# Patient Record
Sex: MALE | Race: WHITE
[De-identification: names, ages, dates, MRNs, and addresses within clinical notes are randomized per-mention and may not be internally consistent; named-entity substitution may affect disease eponyms.]

---

## 2020-02-17 ENCOUNTER — HOSPITAL ENCOUNTER (INPATIENT)
Dept: HOSPITAL 74 - JER | Age: 85
LOS: 8 days | Discharge: HOME | DRG: 291 | End: 2020-02-25
Attending: INTERNAL MEDICINE | Admitting: INTERNAL MEDICINE
Payer: COMMERCIAL

## 2020-02-17 VITALS — BODY MASS INDEX: 19.5 KG/M2

## 2020-02-17 DIAGNOSIS — Z95.1: ICD-10-CM

## 2020-02-17 DIAGNOSIS — E87.6: ICD-10-CM

## 2020-02-17 DIAGNOSIS — I50.23: ICD-10-CM

## 2020-02-17 DIAGNOSIS — R74.0: ICD-10-CM

## 2020-02-17 DIAGNOSIS — I13.0: Primary | ICD-10-CM

## 2020-02-17 DIAGNOSIS — Q61.02: ICD-10-CM

## 2020-02-17 DIAGNOSIS — Z95.5: ICD-10-CM

## 2020-02-17 DIAGNOSIS — I24.8: ICD-10-CM

## 2020-02-17 DIAGNOSIS — R18.8: ICD-10-CM

## 2020-02-17 DIAGNOSIS — K80.20: ICD-10-CM

## 2020-02-17 DIAGNOSIS — N18.4: ICD-10-CM

## 2020-02-17 DIAGNOSIS — E83.42: ICD-10-CM

## 2020-02-17 DIAGNOSIS — H54.8: ICD-10-CM

## 2020-02-17 DIAGNOSIS — R64: ICD-10-CM

## 2020-02-17 DIAGNOSIS — E80.6: ICD-10-CM

## 2020-02-17 DIAGNOSIS — N17.9: ICD-10-CM

## 2020-02-17 DIAGNOSIS — I25.119: ICD-10-CM

## 2020-02-17 LAB
ALBUMIN SERPL-MCNC: 3.4 G/DL (ref 3.4–5)
ALBUMIN SERPL-MCNC: 3.4 G/DL (ref 3.4–5)
ALP SERPL-CCNC: 129 U/L (ref 45–117)
ALP SERPL-CCNC: 130 U/L (ref 45–117)
ALT SERPL-CCNC: 46 U/L (ref 13–61)
ALT SERPL-CCNC: 47 U/L (ref 13–61)
ANION GAP SERPL CALC-SCNC: 10 MMOL/L (ref 8–16)
ANION GAP SERPL CALC-SCNC: 10 MMOL/L (ref 8–16)
ANION GAP SERPL CALC-SCNC: 6 MMOL/L (ref 8–16)
AST SERPL-CCNC: 53 U/L (ref 15–37)
AST SERPL-CCNC: 55 U/L (ref 15–37)
BASOPHILS # BLD: 0.2 % (ref 0–2)
BASOPHILS # BLD: 0.5 % (ref 0–2)
BILIRUB CONJ SERPL-MCNC: 0.5 MG/DL (ref 0–0.2)
BILIRUB SERPL-MCNC: 1.1 MG/DL (ref 0.2–1)
BILIRUB SERPL-MCNC: 1.2 MG/DL (ref 0.2–1)
BUN SERPL-MCNC: 35.7 MG/DL (ref 7–18)
BUN SERPL-MCNC: 36.3 MG/DL (ref 7–18)
CALCIUM SERPL-MCNC: 9 MG/DL (ref 8.5–10.1)
CALCIUM SERPL-MCNC: 9.5 MG/DL (ref 8.5–10.1)
CHLORIDE SERPL-SCNC: 102 MMOL/L (ref 98–107)
CHLORIDE SERPL-SCNC: 103 MMOL/L (ref 98–107)
CHLORIDE SERPL-SCNC: 104 MMOL/L (ref 98–107)
CHOLEST SERPL-MCNC: 185 MG/DL (ref 50–200)
CO2 SERPL-SCNC: 25 MMOL/L (ref 21–32)
CO2 SERPL-SCNC: 27 MMOL/L (ref 21–32)
CO2 SERPL-SCNC: 28 MMOL/L (ref 21–32)
CREAT SERPL-MCNC: 2 MG/DL (ref 0.55–1.3)
CREAT SERPL-MCNC: 2 MG/DL (ref 0.55–1.3)
DEPRECATED RDW RBC AUTO: 15.3 % (ref 11.9–15.9)
DEPRECATED RDW RBC AUTO: 15.4 % (ref 11.9–15.9)
EOSINOPHIL # BLD: 0.1 % (ref 0–4.5)
EOSINOPHIL # BLD: 0.2 % (ref 0–4.5)
GLUCOSE SERPL-MCNC: 104 MG/DL (ref 74–106)
GLUCOSE SERPL-MCNC: 95 MG/DL (ref 74–106)
HCT VFR BLD CALC: 42.3 % (ref 35.4–49)
HCT VFR BLD CALC: 43.7 % (ref 35.4–49)
HDLC SERPL-MCNC: 53 MG/DL (ref 40–60)
HGB BLD-MCNC: 13.8 GM/DL (ref 11.7–16.9)
HGB BLD-MCNC: 14.2 GM/DL (ref 11.7–16.9)
LDLC SERPL CALC-MCNC: 111 MG/DL (ref 5–100)
LYMPHOCYTES # BLD: 10.5 % (ref 8–40)
LYMPHOCYTES # BLD: 15.5 % (ref 8–40)
MCH RBC QN AUTO: 27.5 PG (ref 25.7–33.7)
MCH RBC QN AUTO: 27.6 PG (ref 25.7–33.7)
MCHC RBC AUTO-ENTMCNC: 32.4 G/DL (ref 32–35.9)
MCHC RBC AUTO-ENTMCNC: 32.6 G/DL (ref 32–35.9)
MCV RBC: 84.8 FL (ref 80–96)
MCV RBC: 84.9 FL (ref 80–96)
MONOCYTES # BLD AUTO: 7.5 % (ref 3.8–10.2)
MONOCYTES # BLD AUTO: 8 % (ref 3.8–10.2)
NEUTROPHILS # BLD: 76.6 % (ref 42.8–82.8)
NEUTROPHILS # BLD: 80.9 % (ref 42.8–82.8)
PLATELET # BLD AUTO: 134 K/MM3 (ref 134–434)
PLATELET # BLD AUTO: 156 K/MM3 (ref 134–434)
PMV BLD: 9.5 FL (ref 7.5–11.1)
PMV BLD: 9.9 FL (ref 7.5–11.1)
POTASSIUM SERPLBLD-SCNC: 4.7 MMOL/L (ref 3.5–5.1)
POTASSIUM SERPLBLD-SCNC: 5 MMOL/L (ref 3.5–5.1)
POTASSIUM SERPLBLD-SCNC: 5 MMOL/L (ref 3.5–5.1)
PROT SERPL-MCNC: 6.2 G/DL (ref 6.4–8.2)
PROT SERPL-MCNC: 6.2 G/DL (ref 6.4–8.2)
RBC # BLD AUTO: 4.99 M/MM3 (ref 4–5.6)
RBC # BLD AUTO: 5.14 M/MM3 (ref 4–5.6)
SODIUM SERPL-SCNC: 137 MMOL/L (ref 136–145)
SODIUM SERPL-SCNC: 139 MMOL/L (ref 136–145)
SODIUM SERPL-SCNC: 140 MMOL/L (ref 136–145)
TRIGL SERPL-MCNC: 84 MG/DL (ref 0–150)
WBC # BLD AUTO: 6.7 K/MM3 (ref 4–10)
WBC # BLD AUTO: 8 K/MM3 (ref 4–10)

## 2020-02-17 PROCEDURE — A9537 TC99M MEBROFENIN: HCPCS

## 2020-02-17 RX ADMIN — HEPARIN SODIUM SCH UNIT: 5000 INJECTION, SOLUTION INTRAVENOUS; SUBCUTANEOUS at 21:49

## 2020-02-17 RX ADMIN — ASPIRIN 81 MG SCH MG: 81 TABLET ORAL at 11:07

## 2020-02-17 RX ADMIN — FUROSEMIDE SCH MG: 10 INJECTION, SOLUTION INTRAVENOUS at 13:29

## 2020-02-17 RX ADMIN — ATORVASTATIN CALCIUM SCH MG: 40 TABLET, FILM COATED ORAL at 21:49

## 2020-02-17 RX ADMIN — CLOPIDOGREL BISULFATE SCH MG: 75 TABLET, FILM COATED ORAL at 11:06

## 2020-02-17 RX ADMIN — HEPARIN SODIUM SCH UNIT: 5000 INJECTION, SOLUTION INTRAVENOUS; SUBCUTANEOUS at 13:28

## 2020-02-17 RX ADMIN — HEPARIN SODIUM SCH UNIT: 5000 INJECTION, SOLUTION INTRAVENOUS; SUBCUTANEOUS at 05:20

## 2020-02-17 NOTE — HP
<Apryl Serrano - Last Filed: 02/17/20 06:17>


CHIEF COMPLAINT: shorness of breath, lower extremity edema





PCP: at [facility]





HISTORY OF PRESENT ILLNESS:


85 y.o. M poor historian PMH HTN, CAD s/p 2x stent placements, CHF, legal 

blindness presenting for LE edema & SOB. This began 3 days ago when he noticed 

he was becoming short of breath w/ exertion, now progressing to SOB at rest. He 

endorses orthopnea using 3-4 pillows to sleep at night. Denies paroxysmal 

nocturnal dyspnea, denies cough, no recent URI symptoms. Ambulates with cane 

but not well as he is unable to see. He is unaware of most of his medical 

history as his wife takes care of his medical concerns, but usually follows up 

with  [facility]. Wife is unreachable by phone at this time. 





ROS:


+ SOB, LE edema


denies HA/ CP/ cough/ fevers/ chills/ abd pain/ myalgias/ parasthesias/ urinary 

or bowel changes





ER course was notable for:


(1) trop 0.09


(2) EKG sub optimal quality


(3) BNP 33,000's


(4) 10mg IV Lasix





Recent Travel: denies





PAST MEDICAL HISTORY: as per hpi





PAST SURGICAL HISTORY: CABG





Social History: lives w/ wife


Smoking: denies


Alcohol:denies


Drugs: denies





Allergies





No Known Allergies Allergy (Verified 02/17/20 01:23)


 








HOME MEDICATIONS:











PHYSICAL EXAMINATION


 Vital Signs - 24 hr











  02/17/20 02/17/20





  01:21 03:52


 


Temperature 97.6 F 


 


Pulse Rate 102 H 86


 


Respiratory 18 





Rate  


 


Blood Pressure 121/68 


 


O2 Sat by Pulse 100 98





Oximetry (%)  











GENERAL: Awake, alert, and fully oriented, in no acute distress.


HEENT: NCAT. B/l eye blindness. MMM. No JVD. 


LUNGS: Decr breath sounds at b/l bases R>L. No wheezes, and no crackles. No 

accessory muscle use.


HEART: Tachycardic, normal S1 and S2 without murmur, rub or gallop. Midline 

chest scar noted from prior surgery.


ABDOMEN: Tender to palpation RUW & epigastric region. Soft, not distended, 

normoactive bowel sounds, no guarding.


MUSCULOSKELETAL: Normal range of motion at all joints. No bony deformities or 

tenderness. No CVA tenderness.


EXTREMITIES: 2+ pulses, warm, well-perfused. No peripheral edema. 


PSYCHIATRIC: Appropriate mood and affect.


SKIN: Warm, dry, normal turgor





 Laboratory Results - last 24 hr











  02/17/20 02/17/20 02/17/20





  01:58 01:58 01:58


 


WBC   8.0 


 


RBC   5.14 


 


Hgb   14.2 


 


Hct   43.7 


 


MCV   84.9 


 


MCH   27.5 


 


MCHC   32.4 


 


RDW   15.3 


 


Plt Count   156 


 


MPV   9.9 


 


Absolute Neuts (auto)   6.5 


 


Neutrophils %   80.9 


 


Lymphocytes %   10.5 


 


Monocytes %   8.0 


 


Eosinophils %   0.1 


 


Basophils %   0.5 


 


Nucleated RBC %   0 


 


Sodium  Cancelled  


 


Potassium  Cancelled  


 


Chloride  Cancelled  


 


Carbon Dioxide  Cancelled  


 


Anion Gap  Cancelled  


 


BUN  Cancelled  


 


Creatinine  Cancelled  


 


Est GFR (CKD-EPI)AfAm  Cancelled  


 


Est GFR (CKD-EPI)NonAf  Cancelled  


 


Random Glucose  Cancelled  


 


Calcium  Cancelled  


 


Total Bilirubin  Cancelled  


 


AST  Cancelled  


 


ALT  Cancelled  


 


Alkaline Phosphatase  Cancelled  


 


Creatine Kinase  Cancelled  


 


Creatine Kinase Index   


 


CK-MB (CK-2)   


 


Troponin I  Cancelled  


 


B-Natriuretic Peptide    Cancelled


 


Total Protein  Cancelled  


 


Albumin  Cancelled  














  02/17/20 02/17/20





  02:30 02:30


 


WBC  


 


RBC  


 


Hgb  


 


Hct  


 


MCV  


 


MCH  


 


MCHC  


 


RDW  


 


Plt Count  


 


MPV  


 


Absolute Neuts (auto)  


 


Neutrophils %  


 


Lymphocytes %  


 


Monocytes %  


 


Eosinophils %  


 


Basophils %  


 


Nucleated RBC %  


 


Sodium  140 


 


Potassium  5.0 


 


Chloride  104 


 


Carbon Dioxide  25 


 


Anion Gap  10 


 


BUN  36.3 H 


 


Creatinine  2.0 H 


 


Est GFR (CKD-EPI)AfAm  34.25 


 


Est GFR (CKD-EPI)NonAf  29.56 


 


Random Glucose  104 


 


Calcium  9.0 


 


Total Bilirubin  1.2 H 


 


AST  55 H 


 


ALT  47 


 


Alkaline Phosphatase  130 H 


 


Creatine Kinase  241 


 


Creatine Kinase Index  2.5 


 


CK-MB (CK-2)  6.1 H 


 


Troponin I  0.09 H 


 


B-Natriuretic Peptide   91259.3 H


 


Total Protein  6.2 L 


 


Albumin  3.4 











ASSESSMENT/PLAN:


85 y.o. M poor historian PMH HTN, CAD s/p 2x stent placements, CHF, legal 

blindness presenting for acute CHF exacerbation.





#Acute CHF exacerbation


-Repeat EKG as 1st was suboptimal in quality


-+ trop 0.09, trend


-f/u echo


-daily weights, strict Is & Os


-giving 80mg IV pish lasix; s/p 10mg IV push in ED


-starting 40mg IV lasix BID


-monitor on tele


-cardio consulted





#CARLY vs CKD


-f/u prior labs from [facility]


-BUN/ Cr 36.3/2


-f/u AM labs


-renal U/S


-UA, urine lytes





#HTN


-unsure of home meds unable to contact wife, patient is unsure of his pharmacy


-ASA 81mg daily


-starting low dose beta blocker for now, 12.5mg lopressor


-monitor vitals closely


-med rec 





#Elevated bili


-endorses abd pain on palpation


-f/u direct bili


-f/u RUQ U/S





#DVT PPX


-heparin SQ TID





#FEN


-no standing fluids


-trend lytes replete prn


-sodium controlled diet





#Dispo


-telemetry





Wife is [place]-- patient is unsure of her # but remembered these 2 #'s: [phone]/ [phone]. Called both multiple attempts with no response. 





ATTENDING PHYSICIAN STATEMENT





I saw and evaluated the patient.


I reviewed the resident's note and discussed the case with the resident.


I agree with the resident's findings and plan as documented.








SUBJECTIVE:








OBJECTIVE:








ASSESSMENT AND PLAN:








<Fabio Patel - Last Filed: 02/17/20 06:49>


CHIEF COMPLAINT:





PCP:





HISTORY OF PRESENT ILLNESS:








ER course was notable for:


(1)


(2)


(3)





Recent Travel:





PAST MEDICAL HISTORY:





PAST SURGICAL HISTORY:





Social History:


Smoking:


Alcohol:


Drugs: 





Allergies





No Known Allergies Allergy (Verified 02/17/20 01:23)


 








HOME MEDICATIONS:


 Home Medications











 Medication  Instructions  Recorded


 


Unobtainable  02/17/20








REVIEW OF SYSTEMS


CONSTITUTIONAL: 


Absent:  fever, chills, diaphoresis, generalized weakness, malaise, loss of 

appetite, weight change


HEENT: 


Absent:  rhinorrhea, nasal congestion, throat pain, throat swelling, difficulty 

swallowing, mouth swelling, ear pain, eye pain, visual changes


CARDIOVASCULAR: 


Absent: chest pain, syncope, palpitations, irregular heart rate, lightheadedness

, peripheral edema


RESPIRATORY: 


Absent: cough, shortness of breath, dyspnea with exertion, orthopnea, wheezing, 

stridor, hemoptysis


GASTROINTESTINAL:


Absent: abdominal pain, abdominal distension, nausea, vomiting, diarrhea, 

constipation, melena, hematochezia


GENITOURINARY: 


Absent: dysuria, frequency, urgency, hesitancy, hematuria, flank pain, genital 

pain


MUSCULOSKELETAL: 


Absent: myalgia, arthralgia, joint swelling, back pain, neck pain


SKIN: 


Absent: rash, itching, pallor


HEMATOLOGIC/IMMUNOLOGIC: 


Absent: easy bleeding, easy bruising, lymphadenopathy, frequent infections


ENDOCRINE:


Absent: unexplained weight gain, unexplained weight loss, heat intolerance, 

cold intolerance


NEUROLOGIC: 


Absent: headache, focal weakness or paresthesias, dizziness, unsteady gait, 

seizure, mental status changes, bladder or bowel incontinence


PSYCHIATRIC: 


Absent: anxiety, depression, suicidal or homicidal ideation, hallucinations.








PHYSICAL EXAMINATION


 Vital Signs - 24 hr











  02/17/20 02/17/20 02/17/20





  01:21 03:52 05:28


 


Temperature 97.6 F  


 


Pulse Rate 102 H 86 


 


Pulse Rate [   98 H





Left]   


 


Respiratory 18  16





Rate   


 


Blood Pressure 121/68  


 


Blood Pressure   140/94





[Left Arm]   


 


O2 Sat by Pulse 100 98 100





Oximetry (%)   











GENERAL: Awake, alert, and fully oriented, in no acute distress.


HEAD: Normal with no signs of trauma.


EYES: Pupils equal, round and reactive to light, extraocular movements intact, 

sclera anicteric, conjunctiva clear. No lid lag.


EARS, NOSE, THROAT: Ears normal, nares patent, oropharynx clear without 

exudates. Moist mucous membranes.


NECK: Normal range of motion, supple without lymphadenopathy, JVD, or masses.


LUNGS: Breath sounds equal, clear to auscultation bilaterally. No wheezes, and 

no crackles. No accessory muscle use.


HEART: Regular rate and rhythm, normal S1 and S2 without murmur, rub or gallop.


ABDOMEN: Soft, nontender, not distended, normoactive bowel sounds, no guarding, 

no rebound, no masses.  No hepatomegaly or  splenomegaly. 


MUSCULOSKELETAL: Normal range of motion at all joints. No bony deformities or 

tenderness. No CVA tenderness.


UPPER EXTREMITIES: 2+ pulses, warm, well-perfused. No cyanosis. No clubbing. No 

peripheral edema.


LOWER EXTREMITIES: 2+ pulses, warm, well-perfused. No calf tenderness. No 

peripheral edema. 


NEUROLOGICAL:  Cranial nerves II-XII intact. Normal speech. Normal gait.


PSYCHIATRIC: Cooperative. Good eye contact. Appropriate mood and affect.


SKIN: Warm, dry, normal turgor, no rashes or lesions noted, normal capillary 

refill. 


 Laboratory Results - last 24 hr











  02/17/20 02/17/20 02/17/20





  01:58 01:58 01:58


 


WBC   8.0 


 


RBC   5.14 


 


Hgb   14.2 


 


Hct   43.7 


 


MCV   84.9 


 


MCH   27.5 


 


MCHC   32.4 


 


RDW   15.3 


 


Plt Count   156 


 


MPV   9.9 


 


Absolute Neuts (auto)   6.5 


 


Neutrophils %   80.9 


 


Lymphocytes %   10.5 


 


Monocytes %   8.0 


 


Eosinophils %   0.1 


 


Basophils %   0.5 


 


Nucleated RBC %   0 


 


Sodium  Cancelled  


 


Potassium  Cancelled  


 


Chloride  Cancelled  


 


Carbon Dioxide  Cancelled  


 


Anion Gap  Cancelled  


 


BUN  Cancelled  


 


Creatinine  Cancelled  


 


Est GFR (CKD-EPI)AfAm  Cancelled  


 


Est GFR (CKD-EPI)NonAf  Cancelled  


 


Random Glucose  Cancelled  


 


Calcium  Cancelled  


 


Total Bilirubin  Cancelled  


 


AST  Cancelled  


 


ALT  Cancelled  


 


Alkaline Phosphatase  Cancelled  


 


Creatine Kinase  Cancelled  


 


Creatine Kinase Index   


 


CK-MB (CK-2)   


 


Troponin I  Cancelled  


 


B-Natriuretic Peptide    Cancelled


 


Total Protein  Cancelled  


 


Albumin  Cancelled  














  02/17/20 02/17/20





  02:30 02:30


 


WBC  


 


RBC  


 


Hgb  


 


Hct  


 


MCV  


 


MCH  


 


MCHC  


 


RDW  


 


Plt Count  


 


MPV  


 


Absolute Neuts (auto)  


 


Neutrophils %  


 


Lymphocytes %  


 


Monocytes %  


 


Eosinophils %  


 


Basophils %  


 


Nucleated RBC %  


 


Sodium  140 


 


Potassium  5.0 


 


Chloride  104 


 


Carbon Dioxide  25 


 


Anion Gap  10 


 


BUN  36.3 H 


 


Creatinine  2.0 H 


 


Est GFR (CKD-EPI)AfAm  34.25 


 


Est GFR (CKD-EPI)NonAf  29.56 


 


Random Glucose  104 


 


Calcium  9.0 


 


Total Bilirubin  1.2 H 


 


AST  55 H 


 


ALT  47 


 


Alkaline Phosphatase  130 H 


 


Creatine Kinase  241 


 


Creatine Kinase Index  2.5 


 


CK-MB (CK-2)  6.1 H 


 


Troponin I  0.09 H 


 


B-Natriuretic Peptide   65498.3 H


 


Total Protein  6.2 L 


 


Albumin  3.4 











ASSESSMENT/PLAN:








Visit type





- Emergency Visit


Emergency Visit: Yes


ED Registration Date: 02/17/20


Care time: The patient presented to the Emergency Department on the above date 

and was hospitalized for further evaluation of their emergent condition.





- New Patient


This patient is new to me today: Yes


Date on this admission: 02/17/20





- Critical Care


Critical Care patient: No





ATTENDING PHYSICIAN STATEMENT





I saw and evaluated the patient.


I reviewed the resident's note and discussed the case with the resident.


I agree with the resident's findings and plan as documented.








SUBJECTIVE: 85 years oldf MalePMH HTN, CAD s/p 2x stent placements, CHF, legal 

blindness presented to ED with worsening of b/ LE swelling and shortness of 

breath. According to patient hsi symptoms started 3 days ago and progressively 

getting worse. he is a poor historian and not happy being here as he said he 

suppose to go to hospital in [facility]. He is agitated and wants to 

go to other hospital and wants to talk to his wife. He denies chest pain, nausea

,vomiting,fever,dizziness, LOC. 








OBJECTIVE:


 Last Vital Signs











Temp Pulse Resp BP Pulse Ox


 


 97.6 F   98 H  16   140/94   100 


 


 02/17/20 01:21  02/17/20 05:28  02/17/20 05:28  02/17/20 05:28  02/17/20 05:28











CXR noted for R pleural effusion


elevated bun/cr, elevated BNP  





GENERAL: Normal built,  NAD


Head : NC/ AT 


eyes : b/l eye blindness 


LUNGS:decreased breath sounds Right lower lung 


HEART:RRR,  normal S1 and S2 without murmur, rub or gallop. chest wall scar 

noted 


ABDOMEN: Soft, mild tender on RUQ an depihastric region  not distended,


EXTREMITIES: b/l Lower extremities edema pitting 3+


NEUROLOGICAL:  A&o x3, No focal neuralgic deficit 


SKIN: Warm, dry, normal turgor.








Acute CHF exacerbation ( Unknown EF) 


CARLY vs CKD- unknown baseline '


HTN


CAD s/p stents Hx of ?CABG


Elevated troponin due to CHF vs CARLY r/o ACS 








 








ASSESSMENT AND PLAN:


Admit to tele


serial cardiac enzymes, EKG 


fluid restriction, daily weights


IV lasix 40mg BID


ECHO 


intake out put 


Confirm home medications 


start low dose of betablocker 


urine lytes, 


renal and RUQ US due to elevated bili and mild RUQ pain 


c/w aspirin 81 


SVT ppx - heparin sq


get cardiac work up records from [facility] 





Discussed with resident staff in conference.

## 2020-02-17 NOTE — PN
Physical Exam: 


SUBJECTIVE: Patient seen and examined. He has no complaints. He says he feels 

fatigued and SOB when ambulating.








OBJECTIVE:





 Vital Signs











 Period  Temp  Pulse  Resp  BP Sys/Tyler  Pulse Ox


 


 Last 24 Hr  97.6 F-97.9 F    16-22  121-140/68-96  











GENERAL: The patient is awake, alert, and fully oriented, in no acute distress.


LUNGS: Breath sounds equal, clear to auscultation bilaterally, no wheezes, no 

crackles, no accessory muscle use. 


HEART: Regular rate and rhythm, S1, S2 without murmur, rub or gallop.


ABDOMEN: Soft, nontender, nondistended, normoactive bowel sounds, no guarding, 

no rebound, no hepatosplenomegaly, no masses.


EXTREMITIES: 2+ pulses, warm, well-perfused, 3+ edema. 














 Laboratory Results - last 24 hr











  02/17/20 02/17/20 02/17/20





  01:58 01:58 01:58


 


WBC   8.0 


 


RBC   5.14 


 


Hgb   14.2 


 


Hct   43.7 


 


MCV   84.9 


 


MCH   27.5 


 


MCHC   32.4 


 


RDW   15.3 


 


Plt Count   156 


 


MPV   9.9 


 


Absolute Neuts (auto)   6.5 


 


Neutrophils %   80.9 


 


Lymphocytes %   10.5 


 


Monocytes %   8.0 


 


Eosinophils %   0.1 


 


Basophils %   0.5 


 


Nucleated RBC %   0 


 


Sodium  Cancelled  


 


Potassium  Cancelled  


 


Chloride  Cancelled  


 


Carbon Dioxide  Cancelled  


 


Anion Gap  Cancelled  


 


BUN  Cancelled  


 


Creatinine  Cancelled  


 


Est GFR (CKD-EPI)AfAm  Cancelled  


 


Est GFR (CKD-EPI)NonAf  Cancelled  


 


Random Glucose  Cancelled  


 


Calcium  Cancelled  


 


Total Bilirubin  Cancelled  


 


Direct Bilirubin   


 


AST  Cancelled  


 


ALT  Cancelled  


 


Alkaline Phosphatase  Cancelled  


 


Creatine Kinase  Cancelled  


 


Creatine Kinase Index   


 


CK-MB (CK-2)   


 


Troponin I  Cancelled  


 


B-Natriuretic Peptide    Cancelled


 


Total Protein  Cancelled  


 


Albumin  Cancelled  


 


Triglycerides   


 


Cholesterol   


 


Total LDL Cholesterol   


 


HDL Cholesterol   














  02/17/20 02/17/20 02/17/20





  02:30 02:30 07:35


 


WBC    6.7


 


RBC    4.99


 


Hgb    13.8


 


Hct    42.3


 


MCV    84.8


 


MCH    27.6


 


MCHC    32.6


 


RDW    15.4


 


Plt Count    134


 


MPV    9.5


 


Absolute Neuts (auto)    5.2


 


Neutrophils %    76.6


 


Lymphocytes %    15.5  D


 


Monocytes %    7.5


 


Eosinophils %    0.2  D


 


Basophils %    0.2


 


Nucleated RBC %    0


 


Sodium  140  


 


Potassium  5.0  


 


Chloride  104  


 


Carbon Dioxide  25  


 


Anion Gap  10  


 


BUN  36.3 H  


 


Creatinine  2.0 H  


 


Est GFR (CKD-EPI)AfAm  34.25  


 


Est GFR (CKD-EPI)NonAf  29.56  


 


Random Glucose  104  


 


Calcium  9.0  


 


Total Bilirubin  1.2 H  


 


Direct Bilirubin   


 


AST  55 H  


 


ALT  47  


 


Alkaline Phosphatase  130 H  


 


Creatine Kinase  241  


 


Creatine Kinase Index  2.5  


 


CK-MB (CK-2)  6.1 H  


 


Troponin I  0.09 H  


 


B-Natriuretic Peptide   55739.3 H 


 


Total Protein  6.2 L  


 


Albumin  3.4  


 


Triglycerides   


 


Cholesterol   


 


Total LDL Cholesterol   


 


HDL Cholesterol   














  02/17/20 02/17/20





  07:35 07:35


 


WBC  


 


RBC  


 


Hgb  


 


Hct  


 


MCV  


 


MCH  


 


MCHC  


 


RDW  


 


Plt Count  


 


MPV  


 


Absolute Neuts (auto)  


 


Neutrophils %  


 


Lymphocytes %  


 


Monocytes %  


 


Eosinophils %  


 


Basophils %  


 


Nucleated RBC %  


 


Sodium  137  139


 


Potassium  5.0  4.7


 


Chloride  103  102


 


Carbon Dioxide  28  27


 


Anion Gap  6 L  10


 


BUN  35.7 H 


 


Creatinine  2.0 H 


 


Est GFR (CKD-EPI)AfAm  34.25 


 


Est GFR (CKD-EPI)NonAf  29.56 


 


Random Glucose  95 


 


Calcium  9.5 


 


Total Bilirubin  1.1 H 


 


Direct Bilirubin  0.5 H 


 


AST  53 H 


 


ALT  46 


 


Alkaline Phosphatase  129 H 


 


Creatine Kinase  


 


Creatine Kinase Index  


 


CK-MB (CK-2)  


 


Troponin I  0.10 H 


 


B-Natriuretic Peptide  


 


Total Protein  6.2 L 


 


Albumin  3.4 


 


Triglycerides  84 


 


Cholesterol  185 


 


Total LDL Cholesterol  111 H 


 


HDL Cholesterol  53 








Active Medications











Generic Name Dose Route Start Last Admin





  Trade Name Freq  PRN Reason Stop Dose Admin


 


Aspirin  81 mg  02/17/20 10:00  02/17/20 11:07





  Asa -  PO   81 mg





  DAILY ROSLYN   Administration





     





     





     





     


 


Atorvastatin Calcium  40 mg  02/17/20 22:00  





  Lipitor -  PO   





  HS ROSLYN   





     





     





     





     


 


Clopidogrel Bisulfate  75 mg  02/17/20 10:00  02/17/20 11:06





  Plavix -  PO   75 mg





  DAILY ROSLYN   Administration





     





     





     





     


 


Furosemide  40 mg  02/17/20 14:00  02/17/20 13:29





  Lasix Injection -  IVPUSH   40 mg





  BIDLASIX ROSLYN   Administration





     





     





     





     


 


Heparin Sodium (Porcine)  5,000 unit  02/17/20 06:00  02/17/20 13:28





  Heparin -  SQ   5,000 unit





  TID ROSLYN   Administration





     





     





     





     


 


Metoprolol Succinate  12.5 mg  02/17/20 10:00  02/17/20 11:06





  Toprol Xl -  PO   12.5 mg





  DAILY ROSLYN   Administration





     





     





     





     











ASSESSMENT/PLAN:


This is an 85 year old man with a history of HTN, CAD with stents, CHF, legal 

blindness who presented to the ED with leg swelling and SOB. 





1. Acute on chronic heart failure


   - Unclear if diastolic or systolic


   - Continue Lasix IV


   - Daily weight, I&O


   - Echocardiogram


2. Demand ischemia


3. Possible acute kidney injury, possible stage 3/4 CKD


   - Possible cardiorenal syndrome


   - Montior BUN, creatinine with diuresis


   - Renal US shows right pleural effusion, possible left pleural effusion, 

ascites in upper abdomen, cholelithiasis, mild diffuse gallbladder wall 

thickening, multiple bilateral renal cortical cysts


4. HTN


   - Continue Toprol XL, Lasix


5. CAD, history of stents


   - Continue aspirin, Plavix, Toprol XL, Lipitor


6. Elevated AST, direct bili, alk phos


   - Cholelithiasis, mild diffuse gallbladder wall thickening seen on US


   - Consider HIDA scan





Visit type





- Emergency Visit


Emergency Visit: Yes


ED Registration Date: 02/17/20


Care time: The patient presented to the Emergency Department on the above date 

and was hospitalized for further evaluation of their emergent condition.





- New Patient


This patient is new to me today: Yes


Date on this admission: 02/17/20





- Critical Care


Critical Care patient: No





- Discharge Referral


Referred to [facility] No

## 2020-02-17 NOTE — PDOC
History of Present Illness





- General


Chief Complaint: Shortness of Breath


Stated Complaint: SHORTNESS OF BREATH,ANKLE SWELLING


Time Seen by Provider: 02/17/20 01:20


History Source: Patient





- History of Present Illness


Initial Comments: 


85M PMH legally blind, HTN, CAD s/p 2 stents, CHF BIBEMS for a week of bland 

taste when eating and concern regarding Covid-19. Denies f/c, cough, cp/sob, n/

v. Good appetite except bland taste when he eats. Examiner noticed edema of the 

legs - pt states he has had for 3 days. Pt was admitted at [facility] and 

discharged w/ Torsemide but has not been taking. 











Past History





- Past Medical History


Allergies/Adverse Reactions: 


 Allergies











Allergy/AdvReac Type Severity Reaction Status Date / Time


 


No Known Allergies Allergy   Verified 02/17/20 01:23











Home Medications: 


Ambulatory Orders





Aspirin [ASA -] 81 mg PO DAILY #30 tab.chew 02/24/20 


Carvedilol [Coreg -] 3.125 mg PO BID #60 tablet 02/24/20 


Furosemide [Lasix -] 40 mg PO DAILY #30 tablet 02/24/20 


Lisinopril [Zestril] 2.5 mg PO DAILY #30 tablet 02/24/20 


Spironolactone [Aldactone -] 25 mg PO DAILY #30 tablet 02/24/20 











- Psycho Social/Smoking Cessation Hx


Smoking History: Unknown if ever smoked


Hx Alcohol Use: No


Drug/Substance Use Hx: No





**Review of Systems





- Review of Systems


Comments:: 


CONSTITUTIONAL: Denies F / C


HEENT: Denies sore throat, rhinorrhea


RESP: Denies SOB, cough


CARD: Denies chest pain, palpitations


GI: Denies N / V / D, abdominal pain, bloody stool, inability to tolerate PO


: Denies dysuria


SKIN: Denies rashes








*Physical Exam





- Vital Signs


 Last Vital Signs











Temp Pulse Resp BP Pulse Ox


 


 97.6 F   102 H  18   121/68   100 


 


 02/17/20 01:21  02/17/20 01:21  02/17/20 01:21  02/17/20 01:21  02/17/20 01:21














- Physical Exam





GEN: Well appearing, NAD, comfortable


HEENT: NC/AT. No facial asymmetry. Normal voice. Supple neck w/ FROM.


CV: S1/S2, RRR, no m/r/g


LUNG: CTAB, no wheezes, crackles, rales, rhonchi. 


GI: mild suprapubic ttp o/w soft, ndnt, +BS, no guarding, no rebound.


MSK: 3+ pitting LE edema b/l. No obvious deformities of all extremities. 


SKIN: Warm, dry, no rashes appreciated.


PSYCH: Normal mood and affect.


NEURO: Moving all extremities





ED Treatment Course





- LABORATORY


CBC & Chemistry Diagram: 


 02/21/20 06:02





 02/23/20 05:28





- RADIOLOGY


Radiology Studies Ordered: 














 Category Date Time Status


 


 CHEST X-RAY PORTABLE* [RAD] Stat Radiology  02/17/20 01:40 Ordered














Medical Decision Making





- Medical Decision Making





02/17/20 01:53


85M BIBEMS w/ 3+ b/l pitting edema w/o SOB or crackles. 


DDX - CHF exacerbation; eval for ACS


- CBC, CMP, Cardiac, BNP


- CXR


- EKG 





02/17/20 02:17


notified by lab that JTs hemolyzed, reordering chems 





02/17/20 04:17


labs reviewed, no baseline to compare to 


- slightly elevated trop; may or may not be baseline given CAD and stenting hx 


- elevated BNP [id number]


- BUN/Cr elevated


- cardiac monitor 


- admit 














Discharge





- Discharge Information


Problems reviewed: Yes


Clinical Impression/Diagnosis: 


CHF (congestive heart failure)


Qualifiers:


 Heart failure type: unspecified Heart failure chronicity: unspecified 

Qualified Code(s): I50.9 - Heart failure, unspecified





Condition: Stable





- Follow up/Referral





- Patient Discharge Instructions





- Post Discharge Activity

## 2020-02-17 NOTE — EKG
Test Reason : 

Blood Pressure : ***/*** mmHG

Vent. Rate : 095 BPM     Atrial Rate : 095 BPM

   P-R Int : 184 ms          QRS Dur : 122 ms

    QT Int : 386 ms       P-R-T Axes : 079 -35 126 degrees

   QTc Int : 485 ms

 

NORMAL SINUS RHYTHM

POSSIBLE LEFT ATRIAL ENLARGEMENT

NON-SPECIFIC INTRA-VENTRICULAR CONDUCTION DELAY

LEFTWARD AXIS

Consider  SEPTAL INFARCT (CITED ON OR BEFORE 17-FEB-2020)



ABNORMAL ECG

WHEN COMPARED WITH ECG OF 17-FEB-2020 01:50,

NORMAL SINUS RHYTHM 0CC PREMATURE VENTRICULAR COMPLEXES

NO SIGNIFICANT CHANGE FROM PREVIOUS EKG

Confirmed by Yajaira Koch ([id number]) on 2/17/2020 9:48:28 AM

 

Referred By: NANCY WILLAMS           Confirmed By:Yajaira Koch

## 2020-02-17 NOTE — EKG
Test Reason : 

Blood Pressure : ***/*** mmHG

Vent. Rate : 103 BPM     Atrial Rate : 103 BPM

   P-R Int : 192 ms          QRS Dur : 084 ms

    QT Int : 370 ms       P-R-T Axes : 104 -14 -63 degrees

   QTc Int : 484 ms

 

*** POOR DATA QUALITY, INTERPRETATION MAY BE ADVERSELY AFFECTED

SINUS TACHYCARDIA

ANTEROSEPTAL INFARCT , AGE UNDETERMINED

NON-SPECIFIC INTRA-VENTRICULAR CONDUCTION DELAY

ABNORMAL ECG

WHEN COMPARED WITH ECG OF 27-NOV-2005 08:19,

VENT. RATE HAS INCREASED BY  38 BPM

QUESTIONABLE CHANGE IN QRS DURATION

ANTEROSEPTAL INFARCT IS NOW PRESENT

Confirmed by Yajaira Koch ([id number]) on 2/17/2020 9:56:52 AM

 

Referred By:             Confirmed By:Yajaira Koch

## 2020-02-17 NOTE — CON.CARD
Consult





- History of Present Illness


Chief Complaint: SOB


History of Present Illness: 





85YOM , poor historian, with apparent CABG?, PCIs (pt states had "8 of them, 

last one 8 months ago"), HTN,HLD,CHF?,CKD c/o increasing SOB, Orthopnea, arm 

and legs pain. 


Pt states has not been to his cardiologist at [facility], [address] since last stent 8 

months ago because "been feeling good"; however history is sketchy. States in 

the past had CPs prior to stents but not having any now; In ER found to be in 

CHF, Cr 2, K 5, BNP>66209, given IV lasix and today feels much better





- History Source


History Provided By: Patient, Medical Record





- Past Medical History


Cardio/Vascular: Yes: CAD, CHF, HTN, Hyperlipdemia


Renal/: Yes: Renal Inusuff





- Alcohol/Substance Use


Hx Alcohol Use: No





- Smoking History


Smoking history: Unknown if ever smoked





Home Medications





- Allergies


Allergies/Adverse Reactions: 


 Allergies











Allergy/AdvReac Type Severity Reaction Status Date / Time


 


No Known Allergies Allergy   Verified 02/17/20 01:23














- Home Medications


Home Medications: 


Ambulatory Orders





Unobtainable  02/17/20 











Review of Systems


Findings/Remarks: 





All other systems reviewed negative, except in HPI; +blindness


Vital Signs: 


 Vital Signs











Temperature  97.6 F   02/17/20 01:21


 


Pulse Rate  94 H  02/17/20 07:15


 


Respiratory Rate  16   02/17/20 07:15


 


Blood Pressure  137/96   02/17/20 07:15


 


O2 Sat by Pulse Oximetry (%)  100   02/17/20 07:15











Constitutional: Yes: No Distress, Cachectic


Eyes: No: Sclera Icterus


Neck: Yes: Other (No JVP on sitting)


Respiratory: Yes: Other (No rales, scatter rhonchi, mildly decreased RLL)


Gastrointestinal: Yes: Soft


Cardiovascular: Yes: Regular Rate and Rhythm, Other (No M/).  No: Gallop, Rub


JVD: No


Carotid Bruit: No


Heart Sounds: Yes: S1, S2


Edema: Yes (2+ LE)





- Other Data


Labs, Other Data: 


 CBC, BMP





 02/17/20 07:35 





 02/17/20 07:35 





 Troponin, BNP











  02/17/20 02/17/20 02/17/20





  01:58 01:58 02:30


 


Troponin I  Cancelled   0.09 H


 


B-Natriuretic Peptide   Cancelled 














  02/17/20 02/17/20





  02:30 07:35


 


Troponin I   0.10 H


 


B-Natriuretic Peptide  58127.3 H 








 Troponin, BNP











  02/17/20 02/17/20 02/17/20





  01:58 01:58 02:30


 


Troponin I  Cancelled   0.09 H


 


B-Natriuretic Peptide   Cancelled 














  02/17/20 02/17/20





  02:30 07:35


 


Troponin I   0.10 H


 


B-Natriuretic Peptide  74348.3 H 














Imaging





- Results


Chest X-ray: Report Reviewed





Assessment/Plan





-CHF exacerbation


-CAD, s/p ?CABG, s/p PCI: 


-HTN


-HLD


-CKD


-Legally blind





Plan:


-Get full Hx/meds list from family when available


-Echo


-Trend Troponin


-Cot Lasix


-Add: Plavix 75 mg/d (unril pt's home meds available, then resume home's 

antiplatelet if any)


-Add atorvastatin 40mg/d

## 2020-02-18 LAB
ANION GAP SERPL CALC-SCNC: 9 MMOL/L (ref 8–16)
BUN SERPL-MCNC: 37.6 MG/DL (ref 7–18)
CALCIUM SERPL-MCNC: 8.4 MG/DL (ref 8.5–10.1)
CHLORIDE SERPL-SCNC: 100 MMOL/L (ref 98–107)
CO2 SERPL-SCNC: 28 MMOL/L (ref 21–32)
CREAT SERPL-MCNC: 2 MG/DL (ref 0.55–1.3)
GLUCOSE SERPL-MCNC: 84 MG/DL (ref 74–106)
MAGNESIUM SERPL-MCNC: 2.1 MG/DL (ref 1.8–2.4)
POTASSIUM SERPLBLD-SCNC: 3.5 MMOL/L (ref 3.5–5.1)
SODIUM SERPL-SCNC: 137 MMOL/L (ref 136–145)

## 2020-02-18 RX ADMIN — CLOPIDOGREL BISULFATE SCH MG: 75 TABLET, FILM COATED ORAL at 10:47

## 2020-02-18 RX ADMIN — HEPARIN SODIUM SCH: 5000 INJECTION, SOLUTION INTRAVENOUS; SUBCUTANEOUS at 05:49

## 2020-02-18 RX ADMIN — FUROSEMIDE SCH MG: 10 INJECTION, SOLUTION INTRAVENOUS at 15:23

## 2020-02-18 RX ADMIN — HEPARIN SODIUM SCH UNIT: 5000 INJECTION, SOLUTION INTRAVENOUS; SUBCUTANEOUS at 23:14

## 2020-02-18 RX ADMIN — HEPARIN SODIUM SCH UNIT: 5000 INJECTION, SOLUTION INTRAVENOUS; SUBCUTANEOUS at 06:16

## 2020-02-18 RX ADMIN — FUROSEMIDE SCH MG: 10 INJECTION, SOLUTION INTRAVENOUS at 05:50

## 2020-02-18 RX ADMIN — HEPARIN SODIUM SCH UNIT: 5000 INJECTION, SOLUTION INTRAVENOUS; SUBCUTANEOUS at 15:23

## 2020-02-18 RX ADMIN — ATORVASTATIN CALCIUM SCH MG: 40 TABLET, FILM COATED ORAL at 23:14

## 2020-02-18 RX ADMIN — ASPIRIN 81 MG SCH MG: 81 TABLET ORAL at 10:47

## 2020-02-18 NOTE — CON.CARD
Consult


Consult Specialty:: cardiology


Reason for Consultation:: SOB; hx severe systolic CHF





- History of Present Illness


Chief Complaint: Pt alert; no chest pain; improved breathing (not SOB now; able 

to converse in full sentences without dyspnea). Worried about whether his wife 

knows he is in this hospital.


History of Present Illness: 





85 yr old man (ifeoma Castillo; raised in [place]), with PMH legally blind, HTN, 

CABG,  CAD s/p 2 stents (all cardiac work done at [address], 

per pt), systolic (severely reduced LVEF) CHF,  BIBEMS for a week of bland 

taste when eating and concern regarding Covid-19. Denies f/c, cough, cp/sob, n/

v. Good appetite except bland taste when he eats. Examiner noticed edema of the 

legs - pt states he has had for 3 days. Pt was admitted at [facility] and 

discharged w/ Torsemide but has not been taking. 





Denies hx smoking or heavy drinking.


No hx asthma.





- History Source


History Provided By: Patient, Medical Record


Limitations to Obtaining History: Poor Historian





- Past Medical History


Cardio/Vascular: Yes: CAD, CHF, HTN, Hyperlipdemia


Pulmonary: No: Asthma, COPD


Renal/: Yes: Renal Inusuff





- Past Surgical History


Past Surgical History: Yes: CABG, Stent (coronary)





- Alcohol/Substance Use


Hx Alcohol Use: No





- Smoking History


Smoking history: Never smoked


Have you smoked in the past 12 months: No





Home Medications





- Allergies


Allergies/Adverse Reactions: 


 Allergies











Allergy/AdvReac Type Severity Reaction Status Date / Time


 


No Known Allergies Allergy   Verified 02/17/20 01:23














- Home Medications


Home Medications: 


Ambulatory Orders





Unobtainable  02/17/20 











Family Medical History


Family History: Denies





Review of Systems





- Review of Systems


Eyes: reports: No Symptoms


HENT: reports: No Symptoms


Neck: reports: No Symptoms


Cardiovascular: reports: Shortness of Breath


Respiratory: reports: SOB


Genitourinary: reports: No Symptoms


Breasts: reports: No Symptoms Reported


Musculoskeletal: reports: Muscle Weakness


Integumentary: reports: No Symptoms


Neurological: reports: Weakness


Endocrine: reports: No Symptoms


Hematology/Lymphatic: reports: No Symptoms


Psychiatric: reports: No Symptoms





- Risk Factors


Known Risk Factors: Yes: Age, Gender, Hypertension, Other (CABG; s/p multiple 

coronary stents (all work done at [facility]))


Vital Signs: 


 Vital Signs











Temperature  98.1 F   02/18/20 14:00


 


Pulse Rate  84   02/18/20 14:00


 


Respiratory Rate  18   02/18/20 08:36


 


Blood Pressure  107/70   02/18/20 14:00


 


O2 Sat by Pulse Oximetry (%)  100   02/18/20 08:54











Constitutional: Yes: Anxious


Eyes: Yes: Other (blidn bilaterally)


HENT: Yes: WNL


Neck: Yes: WNL


Respiratory: Yes: Diminished (bilaterally), Tachypnea


Gastrointestinal: Yes: Soft.  No: Tenderness


Renal/: Yes: WNL


Cardiovascular: Yes: Tachycardia


JVD: Yes


Carotid Bruit: No


PMI: Displaced


Heart Sounds: Yes: S1, Split S2


Murmur: Yes: Systolic Murmur, Grade 2


Musculoskeletal: Yes: Muscle Weakness


Extremities: Yes: Cool


Edema: Yes


Edema: LLE: 2+, RLE: 2+


Peripheral Pulses WNL: Yes


Integumentary: Yes: Venous Stasis Changes


Neurological: Yes: Alert, Oriented, Weakness


Psychiatric: Yes: WNL





- Other Data


Labs, Other Data: 


 CBC, BMP





 02/17/20 07:35 





 02/18/20 05:27 





 Abnormal Lab Results











  02/18/20





  05:27


 


BUN  37.6 H


 


Creatinine  2.0 H


 


Calcium  8.4 L











Echo: Report Reviewed


Ejection Fraction %: LVEF < 40 %





Imaging





- Results


Chest X-ray: Image Reviewed (marked congestive changes; bilateral pleural 

effusion)





Problem List





- Problems


(1) Acute on chronic systolic CHF (congestive heart failure)


Assessment/Plan: 


+JVP


Bilateral 2+ pitting edema LEs


CXR: bilateral pleuarl effusion


BNP> 30,000


ECHO: severely reduced LVEF; severe LV dilatation.


TNI 0.12; low CKMB relative index.





Plan:


On furosemide IVP.


Start lisinopril; f/u BUN/Cr, and electrolytes carefully (presently 37/2.0).


F/u Is and Os, daily weight.





Obtain records from [facility] regarding CABG, stents.


Code(s): I50.23 - ACUTE ON CHRONIC SYSTOLIC (CONGESTIVE) HEART FAILURE   





(2) HTN (hypertension)


Code(s): I10 - ESSENTIAL (PRIMARY) HYPERTENSION   





(3) Renal insufficiency


Assessment/Plan: 


Pt denies previoius hx of renal disease.


Code(s): N28.9 - DISORDER OF KIDNEY AND URETER, UNSPECIFIED   





(4) Elevated troponin I level


Code(s): R79.89 - OTHER SPECIFIED ABNORMAL FINDINGS OF BLOOD CHEMISTRY   





(5) H/O heart artery stent


Code(s): Z95.5 - PRESENCE OF CORONARY ANGIOPLASTY IMPLANT AND GRAFT

## 2020-02-18 NOTE — PN
Physical Exam: 


SUBJECTIVE: Patient seen and examined at the bedside. Patient states that he 

feels well but is nervous that he forgot his wife's phone number. Stated that 

he has some dyspnea on exertion. Endorsed good appetite. Otherwise did not have 

acute complaints of cp, abd pain, n/v/c/d, fever, chills, weakness, dizziness, 

lightheadedness, headaches, numbness/tingling. 








OBJECTIVE:





 Vital Signs











 Period  Temp  Pulse  Resp  BP Sys/Tyler  Pulse Ox


 


 Last 24 Hr  97.5 F-98.7 F  84-96  18-22  /62-91  











GENERAL: The patient is awake, alert, and fully oriented, in no acute distress.


EYES: Very poorly reactive pupils. White-out of lenses.


ENT: Oropharynx clear without exudates, moist mucous membranes.


LUNGS: Breath sounds equal, mild bibasilar crackles heard. No wheezes 

auscultated. 


HEART: Regular rate and rhythm, S1, S2 without murmur, rub.


ABDOMEN: Soft, nontender, nondistended, normoactive bowel sounds, no guarding, 

no rebound, no masses. Cook's negative. 


EXTREMITIES: 1+ pulses, warm, well-perfused, 3+ pitting edema up to the knees. 


PSYCH: Normal mood, normal affect.


SKIN: Warm, dry, normal turgor.














 Laboratory Results - last 24 hr











  02/18/20 02/18/20 02/18/20





  05:27 05:30 05:30


 


Sodium  137  


 


Potassium  3.5  


 


Chloride  100  


 


Carbon Dioxide  28  


 


Anion Gap  9  


 


BUN  37.6 H  


 


Creatinine  2.0 H  


 


Est GFR (CKD-EPI)AfAm  34.25  


 


Est GFR (CKD-EPI)NonAf  29.56  


 


Random Glucose  84  


 


Calcium  8.4 L  


 


Magnesium  2.1  


 


Ur Random Creatinine   38.0 


 


Ur Random Sodium    96


 


Ur Random Potassium    35.0


 


Ur Random Chloride    128








Active Medications











Generic Name Dose Route Start Last Admin





  Trade Name Freq  PRN Reason Stop Dose Admin


 


Aspirin  81 mg  02/17/20 10:00  02/18/20 10:47





  Asa -  PO   81 mg





  DAILY ROSLYN   Administration





     





     





     





     


 


Atorvastatin Calcium  40 mg  02/17/20 22:00  02/17/20 21:49





  Lipitor -  PO   40 mg





  HS ROSLYN   Administration





     





     





     





     


 


Clopidogrel Bisulfate  75 mg  02/17/20 10:00  02/18/20 10:47





  Plavix -  PO   75 mg





  DAILY ROSLYN   Administration





     





     





     





     


 


Furosemide  40 mg  02/17/20 14:00  02/18/20 05:50





  Lasix Injection -  IVPUSH   40 mg





  BIDLASIX ROSLYN   Administration





     





     





     





     


 


Heparin Sodium (Porcine)  5,000 unit  02/17/20 06:00  02/18/20 06:16





  Heparin -  SQ   5,000 unit





  TID ROSLYN   Administration





     





     





     





     


 


Metoprolol Succinate  12.5 mg  02/17/20 10:00  02/18/20 10:47





  Toprol Xl -  PO   12.5 mg





  DAILY ROSLYN   Administration





     





     





     





     








ECHO:


The left ventricle is severely dilated.


There is severe global hypokinesis of the left ventricle.


Left ventricular systolic function is severely reduced.


Ejection Fraction = 25%.


The right ventricle is mildly dilated.


The right ventricular systolic function is mildly reduced.


The left atrium is mildly dilated.


The right atrium is mildly dilated.


There is moderate mitral annular calcification.


There is moderate mitral valve thickening.


There is mild to moderate mitral regurgitation.


There is mild tricuspid regurgitation.


Right ventricular systolic pressure is elevated at 42 mmhg.


Assuming the RA pressure is 10 mmHg


There is moderate to severe aortic valve thickening.








ASSESSMENT/PLAN:


Ashu Benson is a 85 year old male with a past medical history of PMH HTN, CAD s

/p 2x stent placements, CHF, legal blindness admitted for acute CHF 

decompensation.





Acute CHF decompensation


- echo as above


- daily weights, strict Is & Os


- weights noting decrease of weight in 1 day


- Lasix 40mg IV BID


- cardiac monitoring


- cardio consulted, recs appreciated, adding Plavix and atorvastatin, trend 

troponin





CARLY vs CKD


- improving


- attempting to obtain records from previous institution


- renal U/S with no noted hydronephrosis. Noted multiple bilateral cortical 

cysts, will need outpatient f/u


- obtain Urine CRE and urea as patient on Lasix





HTN


- unsure of home meds unable to contact wife, patient is unsure of his pharmacy


- ASA 81mg daily


- starting low dose beta blocker for now, 12.5mg lopressor





Elevated bili


- does not have abd pain


- direct bili, elevated 


- RUQ U/S noting cholelithiasis, gallbladder wall thickening and 

pericholecystic fluid


- HIDA to assess for gallbladder pathology, patient is currently asymptomatic





DVT PPX


- heparin 5000 units subq tid





FEN


- no standing fluids


- continue to monitor electrolytes and replete as necessary


- sodium controlled diet, NPO after midnight for HIDA





Dispo


- continue to monitor on telemetry





Visit type





- Emergency Visit


Emergency Visit: Yes


ED Registration Date: 02/17/20


Care time: The patient presented to the Emergency Department on the above date 

and was hospitalized for further evaluation of their emergent condition.





- New Patient


This patient is new to me today: Yes


Date on this admission: 02/18/20





- Critical Care


Critical Care patient: No

## 2020-02-18 NOTE — PN
Teaching Attending Note


Name of Resident: Keo Barbosa





ATTENDING PHYSICIAN STATEMENT





I saw and evaluated the patient.


I reviewed the resident's note and discussed the case with the resident.


I agree with the resident's findings and plan as documented.








SUBJECTIVE: Patient feels SOB with exertion.








OBJECTIVE:


 Vital Signs











 Period  Temp  Pulse  Resp  BP Sys/Tyler  Pulse Ox


 


 Last 24 Hr  97.5 F-98.7 F  84-96  18-21  /62-91  








GENERAL: The patient is awake, alert, and fully oriented, in no acute distress.


LUNGS: Breath sounds equal, clear to auscultation bilaterally, no wheezes, no 

crackles, no accessory muscle use. 


HEART: Regular rate and rhythm, S1, S2 without murmur, rub or gallop.


ABDOMEN: Soft, nontender, nondistended, normoactive bowel sounds, no guarding, 

no rebound, no hepatosplenomegaly, no masses.


EXTREMITIES: 2+ pulses, warm, well-perfused, 3+ edema. 





 Laboratory Results - last 24 hr











  02/18/20 02/18/20 02/18/20





  05:27 05:30 05:30


 


Sodium  137  


 


Potassium  3.5  


 


Chloride  100  


 


Carbon Dioxide  28  


 


Anion Gap  9  


 


BUN  37.6 H  


 


Creatinine  2.0 H  


 


Est GFR (CKD-EPI)AfAm  34.25  


 


Est GFR (CKD-EPI)NonAf  29.56  


 


Random Glucose  84  


 


Calcium  8.4 L  


 


Magnesium  2.1  


 


Ur Random Creatinine   38.0 


 


Ur Random Sodium    96


 


Ur Random Potassium    35.0


 


Ur Random Chloride    128








 Current Medications











Generic Name Dose Route Start Last Admin





  Trade Name Freq  PRN Reason Stop Dose Admin


 


Aspirin  81 mg  02/17/20 10:00  02/18/20 10:47





  Asa -  PO   81 mg





  DAILY ROSLYN   Administration





     





     





     





     


 


Atorvastatin Calcium  40 mg  02/17/20 22:00  02/17/20 21:49





  Lipitor -  PO   40 mg





  HS ROSLYN   Administration





     





     





     





     


 


Clopidogrel Bisulfate  75 mg  02/17/20 10:00  02/18/20 10:47





  Plavix -  PO   75 mg





  DAILY ROSLYN   Administration





     





     





     





     


 


Furosemide  40 mg  02/17/20 14:00  02/18/20 15:23





  Lasix Injection -  IVPUSH   40 mg





  BIDLASIX ROSLYN   Administration





     





     





     





     


 


Heparin Sodium (Porcine)  5,000 unit  02/17/20 06:00  02/18/20 15:23





  Heparin -  SQ   5,000 unit





  TID ROSLYN   Administration





     





     





     





     


 


Metoprolol Succinate  12.5 mg  02/17/20 10:00  02/18/20 10:47





  Toprol Xl -  PO   12.5 mg





  DAILY ROSLYN   Administration





     





     





     





     














ASSESSMENT AND PLAN:


This is an 85 year old man with a history of HTN, CAD with stents, CHF, legal 

blindness who presented to the ED with leg swelling and SOB. 





1. Acute on chronic systolic heart failure


   - Continue Lasix IV


   - Monitor weight, I&O


   - Echocardiogram shows severely dilated LV, severe global hypokinesis of LV, 

LVEF 25%, mildly dilated RV, mildly reduced RV systolic function, mildly 

dilated LA, mildly dilated RA, mild to moderate MR, mild TR, RVSP 42 mmHg


   - Cardiology follow-up


2. Demand ischemia


3. Probable cardiorenal syndrome, stage 4 CKD


   - Creatinine stable


   - Renal US shows right pleural effusion, possible left pleural effusion, 

ascites in upper abdomen, cholelithiasis, mild diffuse gallbladder wall 

thickening, multiple bilateral renal cortical cysts


4. HTN


   - Continue Toprol XL, Lasix


5. CAD, history of stents


   - Continue aspirin, Plavix, Toprol XL, Lipitor


6. Elevated AST, direct bili, alk phos


   - Cholelithiasis, mild diffuse gallbladder wall thickening seen on US


   - HIDA scan

## 2020-02-18 NOTE — ECHO
______________________________________________________________________________



Name: KEN HINSON                                    Exam:Adult Echocardiogram

MRN: [id number]         Study Date: 2020 10:02 AM

Age: 85 yrs

______________________________________________________________________________



Reason For Study: chf

Height: 65 in        Weight: 140 lb        BSA: 1.7 m2



______________________________________________________________________________



MMode/2D Measurements & Calculations

IVSd: 1.0 cm                                 Ao root diam: 3.5 cm

LVIDd: 6.9 cm                                LA dimension: 4.4 cm

LVIDs: 6.0 cm                                ACS: 1.7 cm

LVPWd: 0.78 cm



______________________________________________

LVPWs: 0.70 cm                               EDV(Teich): 250.0 ml

                                             ESV(Teich): 182.5 ml



Doppler Measurements & Calculations

MV E max alexei: 88.1 cm/sec                               Ao V2 max: 104.2 cm/sec

MV A max alexei: 24.2 cm/sec                               Ao max P.3 mmHg

MV E/A: 3.6                                             Ao V2 mean: 79.9 cm/sec

                                                        Ao mean P.8 mmHg

                                                        Ao V2 VTI: 16.9 cm

                                                        AI P1/2t: 442.2 msec



_________________________________________________________

AI max alexei: 306.2 cm/sec                                MR max alexei: 289.3 cm/sec

AI max P.5 mmHg                                    MR max P.6 mmHg



AI dec slope: 202.8 cm/sec2

_________________________________________________________

TR max alexei: 283.0 cm/sec                                PI end-d alexei: 104.5 cm/sec

TR max P.1 mmHg



_________________________________________________________

Med Peak E' Alexei: 3.0 cm/sec

Med E/e': 29.8

Lat Peak E' Alexei: 3.6 cm/sec

Lat E/e': 24.3





______________________________________________________________________________

Procedure

A complete two-dimensional transthoracic echocardiogram was performed (2D, M-mode, Doppler and color 
flow

Doppler).

Left Ventricle

The left ventricle is severely dilated. Left ventricular systolic function is severely reduced. Eject
ion

Fraction = 25%. The transmitral spectral Doppler flow pattern is suggestive of impaired LV relaxation
. There

is severe global hypokinesis of the left ventricle.

Right Ventricle

The right ventricle is mildly dilated. The right ventricular systolic function is mildly reduced.

Atria

The left atrium is mildly dilated. The right atrium is mildly dilated.

Mitral Valve

There is moderate mitral annular calcification. There is moderate mitral valve thickening. There is m
ild to

moderate mitral regurgitation.

Tricuspid Valve

The tricuspid valve is normal in structure and function. There is mild tricuspid regurgitation. Right


ventricular systolic pressure is elevated at 42 mmhg. Assuming the RA pressure is 10 mmHg.

Aortic Valve

There is moderate to severe aortic valve thickening. No hemodynamically significant valvular aortic s
tenosis.

Great Vessels

The aortic root is normal size.

Pericardium/Pleura

There is no pericardial effusion. There is no pleural effusion.



______________________________________________________________________________



Interpretation Summary

The left ventricle is severely dilated.

There is severe global hypokinesis of the left ventricle.

Left ventricular systolic function is severely reduced.

Ejection Fraction = 25%.

The right ventricle is mildly dilated.

The right ventricular systolic function is mildly reduced.

The left atrium is mildly dilated.

The right atrium is mildly dilated.

There is moderate mitral annular calcification.

There is moderate mitral valve thickening.

There is mild to moderate mitral regurgitation.

There is mild tricuspid regurgitation.

Right ventricular systolic pressure is elevated at 42 mmhg.

Assuming the RA pressure is 10 mmHg

There is moderate to severe aortic valve thickening.







MD Tyler Torres 2020 11:53 AM

## 2020-02-19 LAB
ALBUMIN SERPL-MCNC: 2.8 G/DL (ref 3.4–5)
ALP SERPL-CCNC: 117 U/L (ref 45–117)
ALT SERPL-CCNC: 59 U/L (ref 13–61)
ANION GAP SERPL CALC-SCNC: 8 MMOL/L (ref 8–16)
ANION GAP SERPL CALC-SCNC: 9 MMOL/L (ref 8–16)
AST SERPL-CCNC: 74 U/L (ref 15–37)
BASOPHILS # BLD: 0.5 % (ref 0–2)
BILIRUB SERPL-MCNC: 0.8 MG/DL (ref 0.2–1)
BUN SERPL-MCNC: 37.7 MG/DL (ref 7–18)
BUN SERPL-MCNC: 38 MG/DL (ref 7–18)
CALCIUM SERPL-MCNC: 8 MG/DL (ref 8.5–10.1)
CALCIUM SERPL-MCNC: 8.5 MG/DL (ref 8.5–10.1)
CHLORIDE SERPL-SCNC: 97 MMOL/L (ref 98–107)
CHLORIDE SERPL-SCNC: 98 MMOL/L (ref 98–107)
CO2 SERPL-SCNC: 30 MMOL/L (ref 21–32)
CO2 SERPL-SCNC: 30 MMOL/L (ref 21–32)
CREAT SERPL-MCNC: 1.9 MG/DL (ref 0.55–1.3)
CREAT SERPL-MCNC: 2.1 MG/DL (ref 0.55–1.3)
DEPRECATED RDW RBC AUTO: 15.1 % (ref 11.9–15.9)
EOSINOPHIL # BLD: 2.5 % (ref 0–4.5)
GLUCOSE SERPL-MCNC: 100 MG/DL (ref 74–106)
GLUCOSE SERPL-MCNC: 88 MG/DL (ref 74–106)
HCT VFR BLD CALC: 36.9 % (ref 35.4–49)
HGB BLD-MCNC: 12.4 GM/DL (ref 11.7–16.9)
LYMPHOCYTES # BLD: 26.1 % (ref 8–40)
MAGNESIUM SERPL-MCNC: 1.7 MG/DL (ref 1.8–2.4)
MAGNESIUM SERPL-MCNC: 2.3 MG/DL (ref 1.8–2.4)
MCH RBC QN AUTO: 27.9 PG (ref 25.7–33.7)
MCHC RBC AUTO-ENTMCNC: 33.6 G/DL (ref 32–35.9)
MCV RBC: 83 FL (ref 80–96)
MONOCYTES # BLD AUTO: 11.9 % (ref 3.8–10.2)
NEUTROPHILS # BLD: 59 % (ref 42.8–82.8)
PHOSPHATE SERPL-MCNC: 2.4 MG/DL (ref 2.5–4.9)
PLATELET # BLD AUTO: 126 K/MM3 (ref 134–434)
PMV BLD: 9.2 FL (ref 7.5–11.1)
POTASSIUM SERPLBLD-SCNC: 2.9 MMOL/L (ref 3.5–5.1)
POTASSIUM SERPLBLD-SCNC: 3.9 MMOL/L (ref 3.5–5.1)
PROT SERPL-MCNC: 5.4 G/DL (ref 6.4–8.2)
RBC # BLD AUTO: 4.45 M/MM3 (ref 4–5.6)
SODIUM SERPL-SCNC: 136 MMOL/L (ref 136–145)
SODIUM SERPL-SCNC: 136 MMOL/L (ref 136–145)
WBC # BLD AUTO: 5.4 K/MM3 (ref 4–10)

## 2020-02-19 RX ADMIN — HEPARIN SODIUM SCH UNIT: 5000 INJECTION, SOLUTION INTRAVENOUS; SUBCUTANEOUS at 21:24

## 2020-02-19 RX ADMIN — SPIRONOLACTONE SCH MG: 25 TABLET, FILM COATED ORAL at 13:00

## 2020-02-19 RX ADMIN — ASPIRIN 81 MG SCH MG: 81 TABLET ORAL at 13:01

## 2020-02-19 RX ADMIN — ATORVASTATIN CALCIUM SCH MG: 40 TABLET, FILM COATED ORAL at 21:24

## 2020-02-19 RX ADMIN — POTASSIUM CHLORIDE SCH MLS/HR: 7.46 INJECTION, SOLUTION INTRAVENOUS at 11:11

## 2020-02-19 RX ADMIN — POTASSIUM CHLORIDE SCH MLS/HR: 7.46 INJECTION, SOLUTION INTRAVENOUS at 14:21

## 2020-02-19 RX ADMIN — HEPARIN SODIUM SCH UNIT: 5000 INJECTION, SOLUTION INTRAVENOUS; SUBCUTANEOUS at 14:21

## 2020-02-19 RX ADMIN — CLOPIDOGREL BISULFATE SCH MG: 75 TABLET, FILM COATED ORAL at 13:01

## 2020-02-19 RX ADMIN — FUROSEMIDE SCH MG: 10 INJECTION, SOLUTION INTRAVENOUS at 05:50

## 2020-02-19 RX ADMIN — LISINOPRIL SCH MG: 5 TABLET ORAL at 13:01

## 2020-02-19 RX ADMIN — FUROSEMIDE SCH MG: 10 INJECTION, SOLUTION INTRAVENOUS at 14:21

## 2020-02-19 RX ADMIN — POTASSIUM CHLORIDE SCH MLS/HR: 7.46 INJECTION, SOLUTION INTRAVENOUS at 12:15

## 2020-02-19 RX ADMIN — HEPARIN SODIUM SCH UNIT: 5000 INJECTION, SOLUTION INTRAVENOUS; SUBCUTANEOUS at 05:50

## 2020-02-19 NOTE — PN
Teaching Attending Note


Name of Resident: Keo Barbosa





ATTENDING PHYSICIAN STATEMENT





I saw and evaluated the patient.


I reviewed the resident's note and discussed the case with the resident.


I agree with the resident's findings and plan as documented.








SUBJECTIVE:


No SOB , no fever or chills no N/V . feels better 





OBJECTIVE:


NAd, no eye contact 


Cv : RRR


Lungs: CTAB 


Ext : 2+ pitting edema on legs. no erythema 








A/P 





 85 year old man with a history of HTN, CAD with stents, CHF, legal blindness 

who presented to the ED with leg swelling and SOB, he was diagnosed with acute 

systolic CHF 





1- Acute  systolic CHF. improved 


2- demand ischemia 


3- possible CKD 


4- HTN 


5- CAD, s/p stents 


6- Transaminitis 





Plan : 


- cont diuresis 


- add spironolactone 


- cont Lisinopril 


- cont torpol 


- records were requested from OSH 


- evaluation for a life vest depends on the presence of ischemic VS Non 

ischemic CMP . d/w card 


- cont Asa and plavix 


- monitor renal function 


- HIDA neg 


- monitor LFTS 





- DVT PX heparin sq





message left to his wife. social work involved. records pending

## 2020-02-19 NOTE — PN
Progress Note, Physician


Chief Complaint: 





Pt is sitting up at bedside; no chest pain, dyspnea, palpitations, or 

dizziness. c/o constipation since admission to [facility].


History of Present Illness: 





85 yr old man (ifeoma Castillo; raised in [place]), with PMH legally blind, HTN, 

CABG,  CAD s/p 2 stents (all cardiac work done at [facility], [address], 

per pt), systolic (severely reduced LVEF) CHF,  BIBEMS for a week of bland 

taste when eating and concern regarding Covid-19. Denies f/c, cough, cp/sob, n/

v. Good appetite except bland taste when he eats. Examiner noticed edema of the 

legs - pt states he has had for 3 days. Pt was admitted at [facility] and 

discharged w/ Torsemide but has not been taking. 





Denies hx smoking or heavy drinking.


No hx asthma.





- Current Medication List


Current Medications: 


Active Medications





Aspirin (Asa -)  81 mg PO DAILY [facility]


   Last Admin: 02/19/20 13:01 Dose:  81 mg


Atorvastatin Calcium (Lipitor -)  40 mg PO HS [facility]


   Last Admin: 02/18/20 23:14 Dose:  40 mg


Clopidogrel Bisulfate (Plavix -)  75 mg PO DAILY [facility]


   Last Admin: 02/19/20 13:01 Dose:  75 mg


Furosemide (Lasix Injection -)  40 mg IVPUSH BIDLASIX [facility]


   Last Admin: 02/19/20 14:21 Dose:  40 mg


Heparin Sodium (Porcine) (Heparin -)  5,000 unit SQ TID [facility]


   Last Admin: 02/19/20 14:21 Dose:  5,000 unit


Lisinopril (Prinivil)  2.5 mg PO DAILY [facility]


   Last Admin: 02/19/20 13:01 Dose:  2.5 mg


Metoprolol Succinate (Toprol Xl -)  12.5 mg PO DAILY [facility]


   Last Admin: 02/19/20 13:01 Dose:  12.5 mg


Spironolactone (Aldactone -)  25 mg PO DAILY [facility]


   Last Admin: 02/19/20 13:00 Dose:  25 mg











- Objective


Vital Signs: 


 Vital Signs











Temperature  98.4 F   02/19/20 14:00


 


Pulse Rate  84   02/19/20 14:00


 


Respiratory Rate  18   02/19/20 06:00


 


Blood Pressure  104/48 L  02/19/20 14:00


 


O2 Sat by Pulse Oximetry (%)  96   02/18/20 21:00











Constitutional: Yes: Calm


Eyes: Yes: WNL, Other (blind)


HENT: Yes: WNL


Neck: Yes: WNL


Cardiovascular: Yes: S1, S2 (split)


Respiratory: Yes: WNL


Gastrointestinal: Yes: Soft


...Rectal Exam: Yes: Deferred


Genitourinary: Yes: WNL


Breast(s): Yes: WNL


Musculoskeletal: Yes: Muscle Weakness


Extremities: Yes: WNL


Edema: No


Peripheral Pulses WNL: Yes


Integumentary: Yes: WNL


Neurological: Yes: Alert, Oriented, Weakness


Psychiatric: Yes: Alert, Oriented


Labs: 


 CBC, BMP





 02/19/20 05:18 





 Abnormal Lab Results











  02/18/20 02/19/20 02/19/20





  05:27 05:18 05:18


 


Plt Count   126 L 


 


Monocytes %   11.9 H 


 


Potassium    2.9 L*


 


Chloride   


 


BUN  37.6 H   38.0 H


 


Creatinine  2.0 H   1.9 H


 


Calcium  8.4 L   8.0 L


 


Phosphorus   


 


Magnesium    1.7 L


 


AST    74 H


 


Troponin I    0.10 H


 


Total Protein    5.4 L


 


Albumin    2.8 L














  02/19/20





  16:45


 


Plt Count 


 


Monocytes % 


 


Potassium 


 


Chloride  97 L


 


BUN  37.7 H


 


Creatinine  2.1 H


 


Calcium 


 


Phosphorus  2.4 L


 


Magnesium 


 


AST 


 


Troponin I 


 


Total Protein 


 


Albumin 














- ....Imaging


Chest X-ray: Image Reviewed


EKG: Image Reviewed





Problem List





- Problems


(1) Acute on chronic systolic CHF (congestive heart failure)


Assessment/Plan: 


+JVP


Bilateral 2+ pitting edema LEs: slight improvement today.


BNP> 30,000


ECHO: severely reduced LVEF; severe LV dilatation.


TNI 0.10; low CKMB relative index.





Plan:


On furosemide IVP. Repeat CXR in am. Plan to decrease dose as clinically 

improves from acute CHF episode.


Continue metoprlol ER, sprionolactone


Started lisinopril.


Gradually increase doses of above as tolerated (with severe LV dysfunction, may 

need to accept systolic BP in the 90s mmHg as soon as clinically stable).


BUN/Cr, and electrolytes carefully (presently 37/2.0).


F/u Is and Os, daily weight.





Obtain records from [facility] regarding CABG, stents (on both ASA and 

clopidogrel; will stop the latter if stents are more than one year old).


Plan for optimization of medications/doses. 


Will require Life Vest upon discharge, with future followup with cardiology 

regarding possible ICD.


Code(s): I50.23 - ACUTE ON CHRONIC SYSTOLIC (CONGESTIVE) HEART FAILURE   





(2) HTN (hypertension)


Code(s): I10 - ESSENTIAL (PRIMARY) HYPERTENSION   





(3) Renal insufficiency


Assessment/Plan: 


Pt denies previoius hx of renal disease.


Cr 2.0-->1.9


Code(s): N28.9 - DISORDER OF KIDNEY AND URETER, UNSPECIFIED   





(4) Elevated troponin I level


Code(s): R79.89 - OTHER SPECIFIED ABNORMAL FINDINGS OF BLOOD CHEMISTRY   





(5) H/O heart artery stent


Code(s): Z95.5 - PRESENCE OF CORONARY ANGIOPLASTY IMPLANT AND GRAFT

## 2020-02-19 NOTE — PN
Physical Exam: 


SUBJECTIVE: Patient seen and examined at the bedside. Stated he is feeling 

well. Endorsed that his feel have been swollen. Denies any acute complaints of 

cp, sob, abd pain, n/v/c/d, dizziness, lightheadedness, fever, chills, 

headches. 








OBJECTIVE:





 Vital Signs











 Period  Temp  Pulse  Resp  BP Sys/Tyler  Pulse Ox


 


 Last 24 Hr  97.5 F-98.3 F  72-83  18-20  103-123/55-78  96











GENERAL: The patient is awake, alert, and fully oriented, in no acute distress.


EYES: Very poorly reactive pupils. White-out of lenses.


ENT: Oropharynx clear without exudates, moist mucous membranes.


LUNGS: Breath sounds equal, mild bibasilar crackles heard. No wheezes 

auscultated. 


HEART: Regular rate and rhythm, S1, S2 with noted systolic ejection murmur.


ABDOMEN: Soft, nontender, nondistended, normoactive bowel sounds, no guarding, 

no rebound, no masses. Cook's negative. 


EXTREMITIES: 1+ pulses, warm, well-perfused, 3+ pitting edema below the knees. 


PSYCH: Normal mood, normal affect.


SKIN: Warm, dry, normal turgor.

















 Laboratory Results - last 24 hr











  02/18/20 02/19/20 02/19/20





  05:27 05:18 05:18


 


WBC   5.4 


 


RBC   4.45 


 


Hgb   12.4 


 


Hct   36.9 


 


MCV   83.0 


 


MCH   27.9 


 


MCHC   33.6 


 


RDW   15.1 


 


Plt Count   126 L 


 


MPV   9.2 


 


Absolute Neuts (auto)   3.2 


 


Neutrophils %   59.0  D 


 


Lymphocytes %   26.1  D 


 


Monocytes %   11.9 H 


 


Eosinophils %   2.5  D 


 


Basophils %   0.5 


 


Nucleated RBC %   0 


 


Sodium  137   136


 


Potassium  3.5   2.9 L*


 


Chloride  100   98


 


Carbon Dioxide  28   30


 


Anion Gap  9   8


 


BUN  37.6 H   38.0 H


 


Creatinine  2.0 H   1.9 H


 


Est GFR (CKD-EPI)AfAm  34.25   36.45


 


Est GFR (CKD-EPI)NonAf  29.56   31.45


 


Random Glucose  84   88


 


Calcium  8.4 L   8.0 L


 


Magnesium  2.1   1.7 L


 


Total Bilirubin    0.8


 


AST    74 H


 


ALT    59


 


Alkaline Phosphatase    117


 


Troponin I    0.10 H


 


Total Protein    5.4 L


 


Albumin    2.8 L


 


TSH  2.79  








Active Medications











Generic Name Dose Route Start Last Admin





  Trade Name Freq  PRN Reason Stop Dose Admin


 


Aspirin  81 mg  02/17/20 10:00  02/19/20 13:01





  Asa -  PO   81 mg





  DAILY ROSLYN   Administration





     





     





     





     


 


Atorvastatin Calcium  40 mg  02/17/20 22:00  02/18/20 23:14





  Lipitor -  PO   40 mg





  HS ROSLYN   Administration





     





     





     





     


 


Clopidogrel Bisulfate  75 mg  02/17/20 10:00  02/19/20 13:01





  Plavix -  PO   75 mg





  DAILY ROSLYN   Administration





     





     





     





     


 


Furosemide  40 mg  02/17/20 14:00  02/19/20 05:50





  Lasix Injection -  IVPUSH   40 mg





  BIDLASIX ROSLYN   Administration





     





     





     





     


 


Heparin Sodium (Porcine)  5,000 unit  02/17/20 06:00  02/19/20 05:50





  Heparin -  SQ   5,000 unit





  TID ROSLYN   Administration





     





     





     





     


 


Lisinopril  2.5 mg  02/19/20 10:00  02/19/20 13:01





  Prinivil  PO   2.5 mg





  DAILY ROSLYN   Administration





     





     





     





     


 


Metoprolol Succinate  12.5 mg  02/17/20 10:00  02/19/20 13:01





  Toprol Xl -  PO   12.5 mg





  DAILY ROSLYN   Administration





     





     





     





     


 


Spironolactone  25 mg  02/19/20 10:00  02/19/20 13:00





  Aldactone -  PO   25 mg





  DAILY ROSLYN   Administration





     





     





     





     








ECHO:


The left ventricle is severely dilated.


There is severe global hypokinesis of the left ventricle.


Left ventricular systolic function is severely reduced.


Ejection Fraction = 25%.


The right ventricle is mildly dilated.


The right ventricular systolic function is mildly reduced.


The left atrium is mildly dilated.


The right atrium is mildly dilated.


There is moderate mitral annular calcification.


There is moderate mitral valve thickening.


There is mild to moderate mitral regurgitation.


There is mild tricuspid regurgitation.


Right ventricular systolic pressure is elevated at 42 mmhg.


Assuming the RA pressure is 10 mmHg


There is moderate to severe aortic valve thickening.








ASSESSMENT/PLAN:


Ashu Benson is a 85 year old male with a past medical history of PMH HTN, CAD s

/p 2x stent placements, CHF, legal blindness admitted for acute CHF 

decompensation.





Acute CHF decompensation


- echo as above


- daily weights, strict Is & Os


- weights noting decrease of weight in 1 day


- Lasix 40mg IV BID


- cardiac monitoring


- cardio consulted, recs appreciated, adding Plavix and atorvastatin, trend 

troponin


- added lisinopril 2.5mg daily


- added spironolactone 25mg daily


- on metoprolol 12.5mg daily


- as per cardiology, attempt to obtain records if patient had cath/stress test/

echo if patient is to qualify for ICD/Lifevest


- keep electrolytes K 4.0, P 2.5, Mg 2.0


- obtain CXR tomorrow and can likely decrease Lasix dose





CARLY vs CKD


- improving


- attempting to obtain records from previous institution


- renal U/S with no noted hydronephrosis. Noted multiple bilateral cortical 

cysts, will need outpatient f/u


- obtain Urine CRE and urea as patient on Lasix





HTN


- unsure of home meds unable to contact wife, patient is unsure of his pharmacy


- ASA 81mg daily


- starting low dose beta blocker for now, 12.5mg lopressor





Elevated bili


- does not have abd pain


- direct bili, elevated 


- RUQ U/S noting cholelithiasis, gallbladder wall thickening and 

pericholecystic fluid


- HIDA noting no cholecystitis or biliary obstruction. Noted moderate 

gallbladder distension at 2 hours suggesting gallbladder dyskinesis





DVT PPX


- heparin 5000 units subq tid





FEN


- no standing fluids


- continue to monitor electrolytes and replete as necessary, hypokalemia and 

hypomagnesemia noted and repleting


- sodium controlled diet





Dispo


- continue to monitor on telemetry


- phone number for wife Alma, [phone]





Visit type





- Emergency Visit


Emergency Visit: Yes


ED Registration Date: 02/17/20


Care time: The patient presented to the Emergency Department on the above date 

and was hospitalized for further evaluation of their emergent condition.





- New Patient


This patient is new to me today: No





- Critical Care


Critical Care patient: No

## 2020-02-20 LAB
ALBUMIN SERPL-MCNC: 2.9 G/DL (ref 3.4–5)
ALP SERPL-CCNC: 112 U/L (ref 45–117)
ALT SERPL-CCNC: 66 U/L (ref 13–61)
ANION GAP SERPL CALC-SCNC: 7 MMOL/L (ref 8–16)
AST SERPL-CCNC: 80 U/L (ref 15–37)
BASOPHILS # BLD: 0.4 % (ref 0–2)
BILIRUB SERPL-MCNC: 0.7 MG/DL (ref 0.2–1)
BUN SERPL-MCNC: 39.4 MG/DL (ref 7–18)
CALCIUM SERPL-MCNC: 8.3 MG/DL (ref 8.5–10.1)
CHLORIDE SERPL-SCNC: 98 MMOL/L (ref 98–107)
CO2 SERPL-SCNC: 31 MMOL/L (ref 21–32)
CREAT SERPL-MCNC: 1.9 MG/DL (ref 0.55–1.3)
DEPRECATED RDW RBC AUTO: 15 % (ref 11.9–15.9)
EOSINOPHIL # BLD: 1.4 % (ref 0–4.5)
GLUCOSE SERPL-MCNC: 84 MG/DL (ref 74–106)
HCT VFR BLD CALC: 39.4 % (ref 35.4–49)
HGB BLD-MCNC: 12.9 GM/DL (ref 11.7–16.9)
LYMPHOCYTES # BLD: 28.5 % (ref 8–40)
MAGNESIUM SERPL-MCNC: 2.2 MG/DL (ref 1.8–2.4)
MCH RBC QN AUTO: 27.5 PG (ref 25.7–33.7)
MCHC RBC AUTO-ENTMCNC: 32.9 G/DL (ref 32–35.9)
MCV RBC: 83.6 FL (ref 80–96)
MONOCYTES # BLD AUTO: 14.7 % (ref 3.8–10.2)
NEUTROPHILS # BLD: 55 % (ref 42.8–82.8)
PHOSPHATE SERPL-MCNC: 2.4 MG/DL (ref 2.5–4.9)
PLATELET # BLD AUTO: 125 K/MM3 (ref 134–434)
PMV BLD: 8.9 FL (ref 7.5–11.1)
POTASSIUM SERPLBLD-SCNC: 3.8 MMOL/L (ref 3.5–5.1)
PROT SERPL-MCNC: 5.4 G/DL (ref 6.4–8.2)
RBC # BLD AUTO: 4.71 M/MM3 (ref 4–5.6)
SODIUM SERPL-SCNC: 136 MMOL/L (ref 136–145)
WBC # BLD AUTO: 4.8 K/MM3 (ref 4–10)

## 2020-02-20 RX ADMIN — HEPARIN SODIUM SCH UNIT: 5000 INJECTION, SOLUTION INTRAVENOUS; SUBCUTANEOUS at 21:26

## 2020-02-20 RX ADMIN — HEPARIN SODIUM SCH UNIT: 5000 INJECTION, SOLUTION INTRAVENOUS; SUBCUTANEOUS at 14:37

## 2020-02-20 RX ADMIN — CLOPIDOGREL BISULFATE SCH MG: 75 TABLET, FILM COATED ORAL at 09:35

## 2020-02-20 RX ADMIN — LISINOPRIL SCH MG: 5 TABLET ORAL at 09:35

## 2020-02-20 RX ADMIN — SPIRONOLACTONE SCH MG: 25 TABLET, FILM COATED ORAL at 09:35

## 2020-02-20 RX ADMIN — FUROSEMIDE SCH MG: 10 INJECTION, SOLUTION INTRAVENOUS at 14:36

## 2020-02-20 RX ADMIN — ASPIRIN 81 MG SCH MG: 81 TABLET ORAL at 09:35

## 2020-02-20 RX ADMIN — HEPARIN SODIUM SCH UNIT: 5000 INJECTION, SOLUTION INTRAVENOUS; SUBCUTANEOUS at 06:09

## 2020-02-20 RX ADMIN — FUROSEMIDE SCH MG: 10 INJECTION, SOLUTION INTRAVENOUS at 06:09

## 2020-02-20 NOTE — PN
Physical Exam: 


SUBJECTIVE: Patient seen and examined at the bedside. Overnight event noted of 

VT for 13 beats, asymptomatic. Patient stated he was feeling well and improved 

from the previous day. Denied cp, sob, abd pain, n/v/c/d, fever, chills, 

headaches, lightheadedness, dizziness. 








OBJECTIVE:





 Vital Signs











 Period  Temp  Pulse  Resp  BP Sys/Tyler  Pulse Ox


 


 Last 24 Hr  97.7 F-98.4 F  72-84  17-20  /44-68  97-98














GENERAL: The patient is awake, alert, and fully oriented, in no acute distress.


EYES: Very poorly reactive pupils. White-out of lenses.


ENT: Oropharynx clear without exudates, moist mucous membranes.


LUNGS: Breath sounds equal, mild bibasilar crackles heard. No wheezes 

auscultated. 


HEART: Regular rate and rhythm, S1, S2 with noted systolic ejection murmur.


ABDOMEN: Soft, nontender, nondistended, normoactive bowel sounds, no guarding, 

no rebound, no masses. Cook's negative. 


EXTREMITIES: 1+ pulses, warm, well-perfused, 2+ pitting edema below the knees. 


PSYCH: Normal mood, normal affect.


SKIN: Warm, dry, normal turgor.














 Laboratory Results - last 24 hr











  02/19/20 02/20/20 02/20/20





  16:45 05:55 05:55


 


WBC   4.8 


 


RBC   4.71 


 


Hgb   12.9 


 


Hct   39.4 


 


MCV   83.6 


 


MCH   27.5 


 


MCHC   32.9 


 


RDW   15.0 


 


Plt Count   125 L 


 


MPV   8.9 


 


Absolute Neuts (auto)   2.7 


 


Neutrophils %   55.0 


 


Lymphocytes %   28.5 


 


Monocytes %   14.7 H 


 


Eosinophils %   1.4 


 


Basophils %   0.4 


 


Nucleated RBC %   0 


 


Sodium  136   136


 


Potassium  3.9   3.8


 


Chloride  97 L   98


 


Carbon Dioxide  30   31


 


Anion Gap  9   7 L


 


BUN  37.7 H   39.4 H


 


Creatinine  2.1 H   1.9 H


 


Est GFR (CKD-EPI)AfAm  32.29   36.45


 


Est GFR (CKD-EPI)NonAf  27.86   31.45


 


Random Glucose  100   84


 


Calcium  8.5   8.3 L


 


Phosphorus  2.4 L   2.4 L


 


Magnesium  2.3   2.2


 


Total Bilirubin    0.7


 


AST    80 H


 


ALT    66 H


 


Alkaline Phosphatase    112


 


Total Protein    5.4 L


 


Albumin    2.9 L








Active Medications











Generic Name Dose Route Start Last Admin





  Trade Name Freq  PRN Reason Stop Dose Admin


 


Aspirin  81 mg  02/17/20 10:00  02/19/20 13:01





  Asa -  PO   81 mg





  DAILY ROSLYN   Administration





     





     





     





     


 


Atorvastatin Calcium  40 mg  02/17/20 22:00  02/19/20 21:24





  Lipitor -  PO   40 mg





  HS ROSLYN   Administration





     





     





     





     


 


Clopidogrel Bisulfate  75 mg  02/17/20 10:00  02/19/20 13:01





  Plavix -  PO   75 mg





  DAILY ROSLYN   Administration





     





     





     





     


 


Furosemide  20 mg  02/20/20 08:06  





  Lasix Injection -  IVPUSH   





  BIDLASIX ROSLYN   





     





     





     





     


 


Heparin Sodium (Porcine)  5,000 unit  02/17/20 06:00  02/20/20 06:09





  Heparin -  SQ   5,000 unit





  TID ROSLYN   Administration





     





     





     





     


 


Lisinopril  2.5 mg  02/19/20 10:00  02/19/20 13:01





  Prinivil  PO   2.5 mg





  DAILY ROSLYN   Administration





     





     





     





     


 


Metoprolol Succinate  12.5 mg  02/17/20 10:00  02/19/20 13:01





  Toprol Xl -  PO   12.5 mg





  DAILY ROSLYN   Administration





     





     





     





     


 


Spironolactone  25 mg  02/19/20 10:00  02/19/20 13:00





  Aldactone -  PO   25 mg





  DAILY ROSLYN   Administration





     





     





     





     








ECHO:


The left ventricle is severely dilated.


There is severe global hypokinesis of the left ventricle.


Left ventricular systolic function is severely reduced.


Ejection Fraction = 25%.


The right ventricle is mildly dilated.


The right ventricular systolic function is mildly reduced.


The left atrium is mildly dilated.


The right atrium is mildly dilated.


There is moderate mitral annular calcification.


There is moderate mitral valve thickening.


There is mild to moderate mitral regurgitation.


There is mild tricuspid regurgitation.


Right ventricular systolic pressure is elevated at 42 mmhg.


Assuming the RA pressure is 10 mmHg


There is moderate to severe aortic valve thickening.





[facility] Records, procedure date 1/16/18


Cath Report:  LAD ostial 100% stenosis, circumflex patent stent in proximal and 

mid segments, RCA proximal 80% stenosis with CARLIN flow 3.


Placed Abbott Drug Eluting Stent. Recommendations of asa 81mg indefinitely, 

clopidogrel 75mg for 6 months or longer as indicated. Statin indefinitely





ASSESSMENT/PLAN:


Ashu Benson is a 85 year old male with a past medical history of PMH HTN, CAD s

/p 2x stent placements, CHF, legal blindness admitted for acute CHF 

decompensation.





Acute CHF decompensation


- echo as above


- daily weights, strict Is & Os


- weights noting decrease of weight over admission period


- Lasix 20mg IV BID


- cardiac monitoring


- cardio consulted, recs appreciated, added atorvastatin


- continue lisinopril 2.5mg daily


- continue spironolactone 25mg daily


- on metoprolol 12.5mg daily


- can likely discontinue plavix as per cardiology as previous stent placed 

greater than 1 year prior


- previous cardiology records as above


- LifeVest arrangements made and will receive Lifevest upon discharge


- keep electrolytes K 4.0, P 2.5, Mg 2.0


- CXR improved from admission


- walked 100ft with PT





CARLY vs CKD


- improving and stable


- attempting to obtain records from previous institution


- renal U/S with no noted hydronephrosis. Noted multiple bilateral cortical 

cysts, will need outpatient f/u


- obtain Urine CRE and urea as patient on Lasix





HTN


- unsure of home meds, patient is unsure of his pharmacy, attempting to contact 

wife in regards to patient pharmacy


- ASA 81mg daily


- continue 12.5mg lopressor





Elevated bili


- does not have abd pain


- direct bili, elevated 


- rising LFTs, continue to monitor


- RUQ U/S noting cholelithiasis, gallbladder wall thickening and 

pericholecystic fluid


- HIDA noting no cholecystitis or biliary obstruction. Noted moderate 

gallbladder distension at 2 hours suggesting gallbladder dyskinesis





DVT PPX


- heparin 5000 units subq tid





FEN


- no standing fluids


- continue to monitor electrolytes and replete as necessary, hypokalemia and 

hypomagnesemia noted and repleting


- sodium controlled diet





Dispo


- continue to monitor on telemetry


- phone number for wife Alma, [phone]





Visit type





- Emergency Visit


Emergency Visit: Yes


ED Registration Date: 02/17/20


Care time: The patient presented to the Emergency Department on the above date 

and was hospitalized for further evaluation of their emergent condition.





- New Patient


This patient is new to me today: No





- Critical Care


Critical Care patient: No

## 2020-02-20 NOTE — PN
Teaching Attending Note


Name of Resident: Keo Barbosa





ATTENDING PHYSICIAN STATEMENT





I saw and evaluated the patient.


I reviewed the resident's note and discussed the case with the resident.


I agree with the resident's findings and plan as documented.








SUBJECTIVE:


No fever or chills. no pain, no SOB. no  events over night 





OBJECTIVE:


NAD, no eye contact 


CV: RRR


Lungs: CTAB 


Ext: 2+ pitting edema on lower half of the legs. no erythema 








A/P 





85 year old man with a history of HTN, CAD with stents, CHF, legal blindness 

who presented to the ED with leg swelling and SOB, he was diagnosed with acute 

systolic CHF 





1- Acute  systolic CHF. improved 


2- Demand ischemia 


3- Possible CKD 


4- HTN. 


5- CAD, s/p stents 


6- Transaminitis 





Plan : 


- decrease dose of lasix 


- cont  spironolactone 


- cont Lisinopril 


- cont torpol 


- records were requested from OSH , still pending .


- Life vest is needed , process started. team d/w Dr. Ornelas  


- cont Asa and plavix. management of anti plt depends on records 


- monitor renal function


- monitor LFTS . dc lipitor in settign of rising LFTs. might resume later 


- DVT PX heparin sq


- will try to communicate with his wife regarding his pharmacy/ meds/doctors.

## 2020-02-21 LAB
ALBUMIN SERPL-MCNC: 3.2 G/DL (ref 3.4–5)
ALP SERPL-CCNC: 118 U/L (ref 45–117)
ALT SERPL-CCNC: 77 U/L (ref 13–61)
ANION GAP SERPL CALC-SCNC: 8 MMOL/L (ref 8–16)
AST SERPL-CCNC: 84 U/L (ref 15–37)
BASOPHILS # BLD: 0.4 % (ref 0–2)
BILIRUB SERPL-MCNC: 0.6 MG/DL (ref 0.2–1)
BUN SERPL-MCNC: 45.6 MG/DL (ref 7–18)
CALCIUM SERPL-MCNC: 8.4 MG/DL (ref 8.5–10.1)
CHLORIDE SERPL-SCNC: 94 MMOL/L (ref 98–107)
CO2 SERPL-SCNC: 32 MMOL/L (ref 21–32)
CREAT SERPL-MCNC: 2 MG/DL (ref 0.55–1.3)
DEPRECATED RDW RBC AUTO: 15.2 % (ref 11.9–15.9)
EOSINOPHIL # BLD: 1.4 % (ref 0–4.5)
GLUCOSE SERPL-MCNC: 79 MG/DL (ref 74–106)
HCT VFR BLD CALC: 41.1 % (ref 35.4–49)
HGB BLD-MCNC: 13.6 GM/DL (ref 11.7–16.9)
LYMPHOCYTES # BLD: 31.6 % (ref 8–40)
MAGNESIUM SERPL-MCNC: 1.9 MG/DL (ref 1.8–2.4)
MCH RBC QN AUTO: 27.4 PG (ref 25.7–33.7)
MCHC RBC AUTO-ENTMCNC: 33.1 G/DL (ref 32–35.9)
MCV RBC: 82.8 FL (ref 80–96)
MONOCYTES # BLD AUTO: 9.4 % (ref 3.8–10.2)
NEUTROPHILS # BLD: 57.2 % (ref 42.8–82.8)
PHOSPHATE SERPL-MCNC: 2.4 MG/DL (ref 2.5–4.9)
PLATELET # BLD AUTO: 146 K/MM3 (ref 134–434)
PMV BLD: 9.5 FL (ref 7.5–11.1)
POTASSIUM SERPLBLD-SCNC: 4.1 MMOL/L (ref 3.5–5.1)
PROT SERPL-MCNC: 6 G/DL (ref 6.4–8.2)
RBC # BLD AUTO: 4.96 M/MM3 (ref 4–5.6)
SODIUM SERPL-SCNC: 134 MMOL/L (ref 136–145)
WBC # BLD AUTO: 6.4 K/MM3 (ref 4–10)

## 2020-02-21 RX ADMIN — ASPIRIN 81 MG SCH MG: 81 TABLET ORAL at 09:27

## 2020-02-21 RX ADMIN — HEPARIN SODIUM SCH UNIT: 5000 INJECTION, SOLUTION INTRAVENOUS; SUBCUTANEOUS at 14:08

## 2020-02-21 RX ADMIN — HEPARIN SODIUM SCH UNIT: 5000 INJECTION, SOLUTION INTRAVENOUS; SUBCUTANEOUS at 23:01

## 2020-02-21 RX ADMIN — CLOPIDOGREL BISULFATE SCH MG: 75 TABLET, FILM COATED ORAL at 09:27

## 2020-02-21 RX ADMIN — SPIRONOLACTONE SCH MG: 25 TABLET, FILM COATED ORAL at 09:27

## 2020-02-21 RX ADMIN — POTASSIUM & SODIUM PHOSPHATES POWDER PACK 280-160-250 MG SCH PACKET: 280-160-250 PACK at 09:27

## 2020-02-21 RX ADMIN — HEPARIN SODIUM SCH UNIT: 5000 INJECTION, SOLUTION INTRAVENOUS; SUBCUTANEOUS at 07:05

## 2020-02-21 RX ADMIN — LISINOPRIL SCH MG: 5 TABLET ORAL at 09:27

## 2020-02-21 RX ADMIN — FUROSEMIDE SCH MG: 10 INJECTION, SOLUTION INTRAVENOUS at 14:08

## 2020-02-21 RX ADMIN — FUROSEMIDE SCH MG: 10 INJECTION, SOLUTION INTRAVENOUS at 07:05

## 2020-02-21 NOTE — PN
Physical Exam: 


SUBJECTIVE: Patient seen and examined at the bedside. Patient stated that he 

was feeling well and denied any acute complaints of cp, sob, abd pain, n/v/c/d, 

headaches, dizziness, lightheadedness, focal or generalized weakness. 


Wife was called and refused to give information regarding the patient's pharmacy

, primary care physician, or current medications.


Spoken with Uvaldo at [facility] for Lifevest and currently in the process of 

processing paperwork for the LifeVest.








OBJECTIVE:





 Vital Signs











 Period  Temp  Pulse  Resp  BP Sys/Tyler  Pulse Ox


 


 Last 24 Hr  97.5 F-99.4 F  71-80  18-20  /48-71  97-98











GENERAL: The patient is awake, alert, and fully oriented, in no acute distress.


EYES: Very poorly reactive pupils. White-out of lenses.


ENT: Oropharynx clear without exudates, moist mucous membranes.


LUNGS: Breath sounds equal, mild bibasilar crackles heard. No wheezes 

auscultated. 


HEART: Regular rate and rhythm, S1, S2 with noted systolic ejection murmur.


ABDOMEN: Soft, nontender, nondistended, normoactive bowel sounds, no guarding, 

no rebound, no masses. Cook's negative. 


EXTREMITIES: 1+ pulses, warm, well-perfused, 2+ pitting edema below the knees. 


PSYCH: Normal mood, normal affect.


SKIN: Warm, dry, normal turgor.














 Laboratory Results - last 24 hr











  02/21/20 02/21/20





  06:02 06:02


 


WBC  6.4 


 


RBC  4.96 


 


Hgb  13.6 


 


Hct  41.1 


 


MCV  82.8 


 


MCH  27.4 


 


MCHC  33.1 


 


RDW  15.2 


 


Plt Count  146 


 


MPV  9.5 


 


Absolute Neuts (auto)  3.7 


 


Neutrophils %  57.2 


 


Lymphocytes %  31.6 


 


Monocytes %  9.4 


 


Eosinophils %  1.4 


 


Basophils %  0.4 


 


Nucleated RBC %  0 


 


Sodium   134 L


 


Potassium   4.1


 


Chloride   94 L


 


Carbon Dioxide   32


 


Anion Gap   8


 


BUN   45.6 H


 


Creatinine   2.0 H


 


Est GFR (CKD-EPI)AfAm   34.25


 


Est GFR (CKD-EPI)NonAf   29.56


 


Random Glucose   79


 


Calcium   8.4 L


 


Phosphorus   2.4 L


 


Magnesium   1.9


 


Total Bilirubin   0.6


 


AST   84 H


 


ALT   77 H


 


Alkaline Phosphatase   118 H


 


Total Protein   6.0 L


 


Albumin   3.2 L








Active Medications











Generic Name Dose Route Start Last Admin





  Trade Name Freq  PRN Reason Stop Dose Admin


 


Aspirin  81 mg  02/17/20 10:00  02/21/20 09:27





  Asa -  PO   81 mg





  DAILY ROSLYN   Administration





     





     





     





     


 


Furosemide  20 mg  02/20/20 08:06  02/21/20 14:08





  Lasix Injection -  IVPUSH   20 mg





  BIDLASIX ROSLYN   Administration





     





     





     





     


 


Heparin Sodium (Porcine)  5,000 unit  02/17/20 06:00  02/21/20 14:08





  Heparin -  SQ   5,000 unit





  TID ROSLYN   Administration





     





     





     





     


 


Lisinopril  2.5 mg  02/19/20 10:00  02/21/20 09:27





  Prinivil  PO   2.5 mg





  DAILY ROSLYN   Administration





     





     





     





     


 


Metoprolol Succinate  12.5 mg  02/17/20 10:00  02/21/20 09:27





  Toprol Xl -  PO   12.5 mg





  DAILY ROSLYN   Administration





     





     





     





     


 


Potassium Phos/Sodium Phos  1 packet  02/21/20 10:00  02/21/20 09:27





  Phos-Nak Packet -  PO   1 packet





  DAILY ROSLYN   Administration





     





     





     





     


 


Spironolactone  25 mg  02/19/20 10:00  02/21/20 09:27





  Aldactone -  PO   25 mg





  DAILY ROSLYN   Administration





     





     





     





     











ECHO:


The left ventricle is severely dilated.


There is severe global hypokinesis of the left ventricle.


Left ventricular systolic function is severely reduced.


Ejection Fraction = 25%.


The right ventricle is mildly dilated.


The right ventricular systolic function is mildly reduced.


The left atrium is mildly dilated.


The right atrium is mildly dilated.


There is moderate mitral annular calcification.


There is moderate mitral valve thickening.


There is mild to moderate mitral regurgitation.


There is mild tricuspid regurgitation.


Right ventricular systolic pressure is elevated at 42 mmhg.


Assuming the RA pressure is 10 mmHg


There is moderate to severe aortic valve thickening.





[facility] Records, procedure date 1/16/18


Cath Report:  LAD ostial 100% stenosis, circumflex patent stent in proximal and 

mid segments, RCA proximal 80% stenosis with CARLIN flow 3.


Placed Abbott Drug Eluting Stent. Recommendations of asa 81mg indefinitely, 

clopidogrel 75mg for 6 months or longer as indicated. Statin indefinitely





ASSESSMENT/PLAN:


Ashu Benson is a 85 year old male with a past medical history of PMH HTN, CAD s

/p 2x stent placements, CHF, legal blindness admitted for acute CHF 

decompensation.





Acute CHF decompensation


- echo as above


- daily weights, strict Is & Os


- weights noting decrease of weight over admission period


- Lasix 20mg IV BID, will be switched to PO tomorrow


- cardiac monitoring


- cardio consulted, recs appreciated


- atorvastatin 10mg daily


- continue lisinopril 2.5mg daily


- continue spironolactone 25mg daily


- on metoprolol 12.5mg daily


- discontinued Plavix


- previous cardiology records as above


- LifeVest arrangements made and will receive Lifevest upon discharge


- keep electrolytes K 4.0, P 2.5, Mg 2.0


- CXR improved from admission


- walked 100ft with PT


- if unable to get medication reconciliation by discharge, will arrange to have 

new medications sent with patient, advise to not take old medications and 

follow up with cardiology outpatient, spoken with cardiology and agreed. 

Patient will decide with family as to who to follow up with after discharge





CARLY vs CKD


- improving and stable


- attempting to obtain records from previous institution


- renal U/S with no noted hydronephrosis. Noted multiple bilateral cortical 

cysts, will need outpatient f/u


- obtain Urine CRE and urea as patient on Lasix





HTN


- unsure of home meds, patient is unsure of his pharmacy, wife refusing to give 

information about medications or pharmacy


- ASA 81mg daily


- continue 12.5mg lopressor





Elevated bili


- does not have abd pain


- direct bili, elevated 


- rising LFTs, continue to monitor


- RUQ U/S noting cholelithiasis, gallbladder wall thickening and 

pericholecystic fluid


- HIDA noting no cholecystitis or biliary obstruction. Noted moderate 

gallbladder distension at 2 hours suggesting gallbladder dyskinesis





DVT PPX


- heparin 5000 units subq tid





FEN


- no standing fluids


- continue to monitor electrolytes and replete as necessary, hypokalemia and 

hypomagnesemia noted and repleting


- sodium controlled diet





Dispo


- continue to monitor on telemetry


- phone number for wife Alma, [phone]





Visit type





- Emergency Visit


Emergency Visit: Yes


ED Registration Date: 02/17/20


Care time: The patient presented to the Emergency Department on the above date 

and was hospitalized for further evaluation of their emergent condition.





- New Patient


This patient is new to me today: No





- Critical Care


Critical Care patient: No

## 2020-02-21 NOTE — PN
Teaching Attending Note


Name of Resident: Keo Barbosa





ATTENDING PHYSICIAN STATEMENT





I saw and evaluated the patient.


I reviewed the resident's note and discussed the case with the resident.


I agree with the resident's findings and plan as documented.








SUBJECTIVE:


No fever or chills . No HA , no OSB , no CP.   





OBJECTIVE:


NAD


CV: RRR


Lungs: CTAB 


Ext: 2+ pitting edema on legs above the ankles.








A/P 





85 year old man with a history of HTN, CAD with stents, CHF, legal blindness 

who presented to the ED with leg swelling and SOB, he was diagnosed with acute 

systolic CHF. 





1- Acute  systolic CHF. improved 


2- Demand ischemia 


3- Possible CKD 


4- HTN. 


5- CAD, s/p stents. 


6- Transaminitis.





Plan : 


- IV lasix today and switch to PO tomorrow 


- cont  spironolactone 


- cont Lisinopril 


- cont torpol 


- Life vest is pending  


- cont Asa .


- dc plavix as stents were placed in 2018 per obtained records 


- monitor renal function


- monitor LFTS. lipitor on hold , can be resumed as out pt if appropriate 


- wife was finally reached, but refused to provide any information about his 

meds, pharmacy, or any medical info.


- heparin sq.

## 2020-02-22 LAB
ALBUMIN SERPL-MCNC: 3 G/DL (ref 3.4–5)
ALP SERPL-CCNC: 102 U/L (ref 45–117)
ALT SERPL-CCNC: 65 U/L (ref 13–61)
ANION GAP SERPL CALC-SCNC: 8 MMOL/L (ref 8–16)
AST SERPL-CCNC: 67 U/L (ref 15–37)
BILIRUB SERPL-MCNC: 0.9 MG/DL (ref 0.2–1)
BUN SERPL-MCNC: 42.6 MG/DL (ref 7–18)
CALCIUM SERPL-MCNC: 8.6 MG/DL (ref 8.5–10.1)
CHLORIDE SERPL-SCNC: 97 MMOL/L (ref 98–107)
CO2 SERPL-SCNC: 30 MMOL/L (ref 21–32)
CREAT SERPL-MCNC: 1.8 MG/DL (ref 0.55–1.3)
GLUCOSE SERPL-MCNC: 86 MG/DL (ref 74–106)
MAGNESIUM SERPL-MCNC: 1.9 MG/DL (ref 1.8–2.4)
PHOSPHATE SERPL-MCNC: 2.8 MG/DL (ref 2.5–4.9)
POTASSIUM SERPLBLD-SCNC: 4 MMOL/L (ref 3.5–5.1)
PROT SERPL-MCNC: 5.4 G/DL (ref 6.4–8.2)
SODIUM SERPL-SCNC: 134 MMOL/L (ref 136–145)

## 2020-02-22 RX ADMIN — POTASSIUM & SODIUM PHOSPHATES POWDER PACK 280-160-250 MG SCH PACKET: 280-160-250 PACK at 09:37

## 2020-02-22 RX ADMIN — CARVEDILOL SCH MG: 3.12 TABLET, FILM COATED ORAL at 22:06

## 2020-02-22 RX ADMIN — HEPARIN SODIUM SCH UNIT: 5000 INJECTION, SOLUTION INTRAVENOUS; SUBCUTANEOUS at 06:44

## 2020-02-22 RX ADMIN — ASPIRIN 81 MG SCH MG: 81 TABLET ORAL at 09:37

## 2020-02-22 RX ADMIN — HEPARIN SODIUM SCH UNIT: 5000 INJECTION, SOLUTION INTRAVENOUS; SUBCUTANEOUS at 15:13

## 2020-02-22 RX ADMIN — SPIRONOLACTONE SCH MG: 25 TABLET, FILM COATED ORAL at 09:37

## 2020-02-22 RX ADMIN — LISINOPRIL SCH MG: 5 TABLET ORAL at 09:38

## 2020-02-22 RX ADMIN — FUROSEMIDE SCH MG: 40 TABLET ORAL at 09:37

## 2020-02-22 RX ADMIN — HEPARIN SODIUM SCH UNIT: 5000 INJECTION, SOLUTION INTRAVENOUS; SUBCUTANEOUS at 22:06

## 2020-02-22 NOTE — PN
Progress Note, Physician


Chief Complaint: 





Pt is A&Ox3; no chest pain, dyspnea, or palpitations.


History of Present Illness: 





85 yr old man (ifeoma Castillo; raised in [place]), with PMH legally blind, HTN, 

CABG,  CAD s/p 2 stents (all cardiac work done at [facility], [address], 

per pt), systolic (severely reduced LVEF) CHF,  BIBEMS for a week of bland 

taste when eating and concern regarding Covid-19. Denies f/c, cough, cp/sob, n/

v. Good appetite except bland taste when he eats. Examiner noticed edema of the 

legs - pt states he has had for 3 days. Pt was admitted at [facility] and 

discharged w/ Torsemide but has not been taking. 





Denies hx smoking or heavy drinking.


No hx asthma.





- Current Medication List


Current Medications: 


Active Medications





Aspirin (Asa -)  81 mg PO DAILY [facility]


   Last Admin: 02/21/20 09:27 Dose:  81 mg


Furosemide (Lasix -)  40 mg PO DAILY [facility]


Heparin Sodium (Porcine) (Heparin -)  5,000 unit SQ TID [facility]


   Last Admin: 02/21/20 23:01 Dose:  5,000 unit


Lisinopril (Prinivil)  2.5 mg PO DAILY [facility]


   Last Admin: 02/21/20 09:27 Dose:  2.5 mg


Metoprolol Succinate (Toprol Xl -)  12.5 mg PO DAILY [facility]


   Last Admin: 02/21/20 09:27 Dose:  12.5 mg


Potassium Phos/Sodium Phos (Phos-Nak Packet -)  1 packet PO DAILY [facility]


   Last Admin: 02/21/20 09:27 Dose:  1 packet


Spironolactone (Aldactone -)  25 mg PO DAILY [facility]


   Last Admin: 02/21/20 09:27 Dose:  25 mg











- Objective


Vital Signs: 


 Vital Signs











Temperature  97.9 F   02/21/20 18:00


 


Pulse Rate  80   02/21/20 18:00


 


Respiratory Rate  20   02/21/20 18:00


 


Blood Pressure  123/54 L  02/21/20 18:00


 


O2 Sat by Pulse Oximetry (%)  97   02/21/20 09:00











Constitutional: Yes: Calm


Eyes: Yes: WNL


HENT: Yes: WNL


Cardiovascular: Yes: S1, S2 (split)


Respiratory: Yes: Diminished (right base)


Gastrointestinal: Yes: Soft


...Rectal Exam: Yes: Deferred


Genitourinary: No: Anuria


Breast(s): Yes: WNL


Musculoskeletal: Yes: Muscle Weakness


Extremities: Yes: Cool


Edema: No


Peripheral Pulses WNL: Yes


Integumentary: Yes: WNL


Neurological: Yes: Alert, Oriented, Weakness


Psychiatric: Yes: Alert, Oriented


Labs: 


 CBC, BMP





 02/21/20 06:02 





 02/21/20 06:02 











- ....Imaging


Chest X-ray: Image Reviewed


EKG: Image Reviewed





Problem List





- Problems


(1) Acute on chronic systolic CHF (congestive heart failure)


Assessment/Plan: 





CXR: right basal pleural effusion.


Plan:


On furosemide IVP. Plan to decrease dose as clinically improves from acute CHF 

episode.


Continue metoprlol ER, sprionolactone


Started lisinopril.


Gradually increase doses of above as tolerated (with severe LV dysfunction, may 

need to accept systolic BP in the 90s mmHg as soon as clinically stable).


BUN/Cr, and electrolytes carefully (presently 37/2.0).


F/u Is and Os, daily weight.





Obtain records from [facility] regarding CABG, stents (on both ASA and 

clopidogrel; will stop the latter if stents are more than one year old).


Plan for optimization of medications/doses. 


Will require Life Vest upon discharge, with future followup with cardiology 

regarding possible ICD.


Code(s): I50.23 - ACUTE ON CHRONIC SYSTOLIC (CONGESTIVE) HEART FAILURE   





(2) HTN (hypertension)


Code(s): I10 - ESSENTIAL (PRIMARY) HYPERTENSION   





(3) Renal insufficiency


Assessment/Plan: 


Pt denies previoius hx of renal disease.


Cr 2.0-->1.9


Code(s): N28.9 - DISORDER OF KIDNEY AND URETER, UNSPECIFIED   





(4) Elevated troponin I level


Code(s): R79.89 - OTHER SPECIFIED ABNORMAL FINDINGS OF BLOOD CHEMISTRY   





(5) H/O heart artery stent


Code(s): Z95.5 - PRESENCE OF CORONARY ANGIOPLASTY IMPLANT AND GRAFT

## 2020-02-22 NOTE — PN
Physical Exam: 


SUBJECTIVE: Patient seen and examined at bedside. Feels well. States his 

breathing is much better.








OBJECTIVE:





                                   Vital Signs











 Period  Temp  Pulse  Resp  BP Sys/Tyler  Pulse Ox


 


 Last 24 Hr  97.9 F  78-82  18-20  103-123/54-62  











Gen: NAD


HEENT: NCAT, blind


Neck: supple, no bruits, no jvd noted


Cardio: irregular, tachycardic, normal s1s2, no mrg noted


Pulm: cta b/l


Abd: soft, nontender, nondistended


Ext: 3+ pitting edema to the midshin b/l














                         Laboratory Results - last 24 hr











  02/22/20





  05:28


 


Sodium  134 L


 


Potassium  4.0


 


Chloride  97 L


 


Carbon Dioxide  30


 


Anion Gap  8


 


BUN  42.6 H


 


Creatinine  1.8 H


 


Est GFR (CKD-EPI)AfAm  38.91


 


Est GFR (CKD-EPI)NonAf  33.57


 


Random Glucose  86


 


Calcium  8.6


 


Phosphorus  2.8


 


Magnesium  1.9


 


Total Bilirubin  0.9


 


AST  67 H


 


ALT  65 H


 


Alkaline Phosphatase  102


 


Total Protein  5.4 L


 


Albumin  3.0 L








Active Medications











Generic Name Dose Route Start Last Admin





  Trade Name Freq  PRN Reason Stop Dose Admin


 


Aspirin  81 mg  02/17/20 10:00  02/22/20 09:37





  Asa -  PO   81 mg





  DAILY ROSLYN   Administration





     





     





     





     


 


Furosemide  40 mg  02/22/20 10:00  02/22/20 09:37





  Lasix -  PO   40 mg





  DAILY ROSLYN   Administration





     





     





     





     


 


Heparin Sodium (Porcine)  5,000 unit  02/17/20 06:00  02/22/20 06:44





  Heparin -  SQ   5,000 unit





  TID ROSLYN   Administration





     





     





     





     


 


Lisinopril  2.5 mg  02/19/20 10:00  02/22/20 09:38





  Prinivil  PO   2.5 mg





  DAILY ROSLYN   Administration





     





     





     





     


 


Metoprolol Succinate  12.5 mg  02/17/20 10:00  02/22/20 09:37





  Toprol Xl -  PO   12.5 mg





  DAILY ROSLYN   Administration





     





     





     





     


 


Potassium Phos/Sodium Phos  1 packet  02/21/20 10:00  02/22/20 09:37





  Phos-Nak Packet -  PO   1 packet





  DAILY ROSLYN   Administration





     





     





     





     


 


Spironolactone  25 mg  02/19/20 10:00  02/22/20 09:37





  Aldactone -  PO   25 mg





  DAILY ROSLYN   Administration





     





     





     





     











ASSESSMENT/PLAN:





85 year old male with a past medical history of PMH HTN, CAD s/p 2x stent 

placements, CHF, legal blindness admitted for acute CHF decompensation.





CHF


-EF 25%


-daily weights, I/Os


-c/w lasix 40 po, lisinopril, metoprolol, spironolactone


-





CARLY vs CKD


-crt appears stable


-unclear baseline


-mult cysts. Will need outpt follow up





HTN


-BP well controlled on metorprolol





Hyperbilirubinemia


-resolved


-s/p RUQ U/S and HIDA showing cholestasis without obstruction

















Visit type





- Emergency Visit


Emergency Visit: No





- New Patient


This patient is new to me today: Yes


Date on this admission: 02/22/20





- Critical Care


Critical Care patient: No





- Discharge Referral


Referred to [facility] No





ATTENDING PHYSICIAN STATEMENT





I saw and evaluated the patient.


I reviewed the resident's note and discussed the case with the resident.


I agree with the resident's findings and plan as documented.








SUBJECTIVE:








OBJECTIVE:








ASSESSMENT AND PLAN:

## 2020-02-22 NOTE — PN
Progress Note, Physician


Chief Complaint: 





Events noted


Not in distress


Coverage for Dr. Sunny Chopra


History of Present Illness: 





Patient was seen and examined. Awake and alert. Chart was reviewed


Denies chest pain, SOB or palpitations


Blind





- Current Medication List


Current Medications: 


Active Medications





Aspirin (Asa -)  81 mg PO DAILY [facility]


   Last Admin: 02/22/20 09:37 Dose:  81 mg


Furosemide (Lasix -)  40 mg PO DAILY [facility]


   Last Admin: 02/22/20 09:37 Dose:  40 mg


Heparin Sodium (Porcine) (Heparin -)  5,000 unit SQ TID [facility]


   Last Admin: 02/22/20 15:13 Dose:  5,000 unit


Lisinopril (Prinivil)  2.5 mg PO DAILY [facility]


   Last Admin: 02/22/20 09:38 Dose:  2.5 mg


Metoprolol Succinate (Toprol Xl -)  12.5 mg PO DAILY [facility]


   Last Admin: 02/22/20 09:37 Dose:  12.5 mg


Potassium Phos/Sodium Phos (Phos-Nak Packet -)  1 packet PO DAILY [facility]


   Last Admin: 02/22/20 09:37 Dose:  1 packet


Spironolactone (Aldactone -)  25 mg PO DAILY [facility]


   Last Admin: 02/22/20 09:37 Dose:  25 mg











- Objective


Vital Signs: 


 Vital Signs











Temperature  98.1 F   02/22/20 14:39


 


Pulse Rate  98 H  02/22/20 14:39


 


Respiratory Rate  18   02/22/20 14:39


 


Blood Pressure  147/56 L  02/22/20 14:39


 


O2 Sat by Pulse Oximetry (%)  97   02/21/20 09:00











Neck: Yes: Supple


Cardiovascular: Yes: Regular Rate and Rhythm, Murmur (SM), S1, S2


Respiratory: Yes: CTA Bilaterally


Gastrointestinal: Yes: Normal Bowel Sounds, Soft.  No: Tenderness


Edema: No


Additional Findings/Remarks: 








- Review of Systems


Constitutional: denies: Chills, Fever


Cardiovascular: denies Palpitations, (+) Shortness of Breath.  denies: Chest 

Pain


Respiratory: denies Cough, (+) SOB.  denies: Hemoptysis, Orthopnea, PND


Gastrointestinal: denies: Abdominal Pain, Constipation, Diarrhea, Melena, Nausea

, Rectal Bleeding, Vomiting


Genitourinary: denies: Dysuria, Hematuria


Musculoskeletal: denies Joint Pain.  denies: Back Pain


Neurological: denies: Dizziness, Headache, Seizure, Syncope








Labs: 


 CBC, BMP





 02/21/20 06:02 





 02/22/20 05:28 











Problem List





- Problems


(1) Hx of CABG


Code(s): Z95.1 - PRESENCE OF AORTOCORONARY BYPASS GRAFT   





(2) Acute on chronic systolic CHF (congestive heart failure)


Code(s): I50.23 - ACUTE ON CHRONIC SYSTOLIC (CONGESTIVE) HEART FAILURE   





(3) Elevated troponin I level


Code(s): R79.89 - OTHER SPECIFIED ABNORMAL FINDINGS OF BLOOD CHEMISTRY   





(4) H/O heart artery stent


Code(s): Z95.5 - PRESENCE OF CORONARY ANGIOPLASTY IMPLANT AND GRAFT   





(5) HTN (hypertension)


Code(s): I10 - ESSENTIAL (PRIMARY) HYPERTENSION   


Qualifiers: 


   Hypertension type: essential hypertension   Qualified Code(s): I10 - 

Essential (primary) hypertension   





(6) Renal insufficiency


Code(s): N28.9 - DISORDER OF KIDNEY AND URETER, UNSPECIFIED   





Assessment/Plan








1. Acute on chronic LV systolic failure with severely reduced LVEF


2. CAD s/p CABG, s/p PCI/stent, angina


3. HTN


4. CKD


5. Demand ischemia





PLAN:


1. Continue ASA 81 mg QD


2. Change Metoprolol to Carvedilol 3.125 mg BID


3. Continue Lisinopril for now but may consider Entresto therapy starting at 24/

26 mg BID if renal function remains stable


4. Continue Spironolactone 25 mg QD


5. Diuretics (Lasix 40 mg PO QD) and monitor renal function and electrolytes


6. Await LifeVest according to primary cardiology team (although patient 

appears to be reluctant)





Further plans are to follow


Cristian Rosas MD

## 2020-02-22 NOTE — PN
Teaching Attending Note


Name of Resident: Fan Chan





ATTENDING PHYSICIAN STATEMENT





I saw and evaluated the patient.


I reviewed the resident's note and discussed the case with the resident.


I agree with the resident's findings and plan as documented.








SUBJECTIVE:


no fever or chills. no pain , no SOB . no CP 





OBJECTIVE:





NAD


CV: RRR


Lungs: CTAB 


Ext: 1+ pitting edema on lower legs above the ankles.








A/P 





85 year old man with a history of HTN, CAD with stents, CHF, legal blindness 

who presented to the ED with leg swelling and SOB, he was diagnosed with acute 

systolic CHF. 





1- Acute  systolic CHF. improved 


2- Demand ischemia 


3- Possible CKD 


4- HTN. 


5- CAD, s/p stents. 


6- Transaminitis.





Plan : 





- tele with an episode of MAT 


- po lasix starting today 


- cont  spironolactone 


- cont Lisinopril 


- cont torpol 


- Life vest is pending  


- cont Asa .


- off plavix now . will not resume after dc 


- monitor renal function


- monitor LFTS. lipitor on hold , can be resumed as out pt if appropriate 


- heparin sq. 





Dc is pending life vest as patient is at high risk for life threatening  

arrhythmias

## 2020-02-22 NOTE — PN
Progress Note, Physician


Chief Complaint: 





Pt is A&Ox3; sitting up at bedside; no chest pain, dyspnea, or palpitations.


History of Present Illness: 





85 yr old man (ifeoma Castillo; raised in [place]), with PMH legally blind, HTN, 

CABG,  CAD s/p 2 stents (all cardiac work done at [facility], [address], 

per pt), systolic (severely reduced LVEF) CHF,  BIBEMS for a week of bland 

taste when eating and concern regarding Covid-19. Denies f/c, cough, cp/sob, n/

v. Good appetite except bland taste when he eats. Examiner noticed edema of the 

legs - pt states he has had for 3 days. Pt was admitted at [facility] and 

discharged w/ Torsemide but has not been taking. 





Denies hx smoking or heavy drinking.


No hx asthma.





- Current Medication List


Current Medications: 


Active Medications





Aspirin (Asa -)  81 mg PO DAILY [facility]


   Last Admin: 02/21/20 09:27 Dose:  81 mg


Furosemide (Lasix -)  40 mg PO DAILY [facility]


Heparin Sodium (Porcine) (Heparin -)  5,000 unit SQ TID [facility]


   Last Admin: 02/21/20 23:01 Dose:  5,000 unit


Lisinopril (Prinivil)  2.5 mg PO DAILY [facility]


   Last Admin: 02/21/20 09:27 Dose:  2.5 mg


Metoprolol Succinate (Toprol Xl -)  12.5 mg PO DAILY [facility]


   Last Admin: 02/21/20 09:27 Dose:  12.5 mg


Potassium Phos/Sodium Phos (Phos-Nak Packet -)  1 packet PO DAILY [facility]


   Last Admin: 02/21/20 09:27 Dose:  1 packet


Spironolactone (Aldactone -)  25 mg PO DAILY [facility]


   Last Admin: 02/21/20 09:27 Dose:  25 mg











- Objective


Vital Signs: 


 Vital Signs











Temperature  97.9 F   02/21/20 18:00


 


Pulse Rate  80   02/21/20 18:00


 


Respiratory Rate  20   02/21/20 18:00


 


Blood Pressure  123/54 L  02/21/20 18:00


 


O2 Sat by Pulse Oximetry (%)  97   02/21/20 09:00











Constitutional: Yes: No Distress


Eyes: Yes: Other (blindness bilaterally)


HENT: Yes: WNL


Neck: Yes: WNL


Cardiovascular: Yes: Murmur, S1, S2


Respiratory: Yes: Regular


Gastrointestinal: Yes: Soft


...Rectal Exam: Yes: Deferred


Genitourinary: No: Anuria


Musculoskeletal: Yes: Joint Stiffness, Muscle Weakness


Extremities: Yes: Cool


Edema: No


Peripheral Pulses WNL: Yes


Integumentary: Yes: WNL


Neurological: Yes: Alert, Oriented, Weakness


Psychiatric: Yes: Alert, Oriented


Labs: 


 CBC, BMP





 02/21/20 06:02 





 02/21/20 06:02 











- ....Imaging


EKG: Image Reviewed





Problem List





- Problems


(1) Acute on chronic systolic CHF (congestive heart failure)


Assessment/Plan: 





s/p 2 coronary stents 2018 at [facility].


On ASA; clopoidogrel discontinued.


On metoprolol, lisinopril, and spironolactone; furosemide prn.


F/u BUN/Cr, electrolytes, daily weight, Is and Os.





When discharged home, will be on Life Vest unless pt refuses. (When questioned 

regarding prior workup, pt believes his cardiac team had not wanted to consider 

ICD due to his age. He plans to discuss this with his wife and sons).


Code(s): I50.23 - ACUTE ON CHRONIC SYSTOLIC (CONGESTIVE) HEART FAILURE   





(2) HTN (hypertension)


Code(s): I10 - ESSENTIAL (PRIMARY) HYPERTENSION   





(3) Renal insufficiency


Assessment/Plan: 


Pt denies previoius hx of renal disease.


Cr 2.0-->1.9


Code(s): N28.9 - DISORDER OF KIDNEY AND URETER, UNSPECIFIED   





(4) Elevated troponin I level


Code(s): R79.89 - OTHER SPECIFIED ABNORMAL FINDINGS OF BLOOD CHEMISTRY   





(5) H/O heart artery stent


Code(s): Z95.5 - PRESENCE OF CORONARY ANGIOPLASTY IMPLANT AND GRAFT

## 2020-02-23 LAB
ALBUMIN SERPL-MCNC: 2.7 G/DL (ref 3.4–5)
ALP SERPL-CCNC: 86 U/L (ref 45–117)
ALT SERPL-CCNC: 54 U/L (ref 13–61)
ANION GAP SERPL CALC-SCNC: 5 MMOL/L (ref 8–16)
AST SERPL-CCNC: 54 U/L (ref 15–37)
BILIRUB SERPL-MCNC: 1 MG/DL (ref 0.2–1)
BUN SERPL-MCNC: 38.5 MG/DL (ref 7–18)
CALCIUM SERPL-MCNC: 8.3 MG/DL (ref 8.5–10.1)
CHLORIDE SERPL-SCNC: 96 MMOL/L (ref 98–107)
CO2 SERPL-SCNC: 31 MMOL/L (ref 21–32)
CREAT SERPL-MCNC: 1.6 MG/DL (ref 0.55–1.3)
GLUCOSE SERPL-MCNC: 86 MG/DL (ref 74–106)
POTASSIUM SERPLBLD-SCNC: 4.3 MMOL/L (ref 3.5–5.1)
PROT SERPL-MCNC: 5.1 G/DL (ref 6.4–8.2)
SODIUM SERPL-SCNC: 133 MMOL/L (ref 136–145)

## 2020-02-23 RX ADMIN — FUROSEMIDE SCH MG: 40 TABLET ORAL at 10:06

## 2020-02-23 RX ADMIN — CARVEDILOL SCH MG: 3.12 TABLET, FILM COATED ORAL at 10:06

## 2020-02-23 RX ADMIN — POTASSIUM & SODIUM PHOSPHATES POWDER PACK 280-160-250 MG SCH PACKET: 280-160-250 PACK at 10:06

## 2020-02-23 RX ADMIN — ASPIRIN 81 MG SCH MG: 81 TABLET ORAL at 10:06

## 2020-02-23 RX ADMIN — HEPARIN SODIUM SCH UNIT: 5000 INJECTION, SOLUTION INTRAVENOUS; SUBCUTANEOUS at 21:34

## 2020-02-23 RX ADMIN — LISINOPRIL SCH MG: 5 TABLET ORAL at 10:07

## 2020-02-23 RX ADMIN — HEPARIN SODIUM SCH UNIT: 5000 INJECTION, SOLUTION INTRAVENOUS; SUBCUTANEOUS at 06:42

## 2020-02-23 RX ADMIN — HEPARIN SODIUM SCH UNIT: 5000 INJECTION, SOLUTION INTRAVENOUS; SUBCUTANEOUS at 14:28

## 2020-02-23 RX ADMIN — SPIRONOLACTONE SCH MG: 25 TABLET, FILM COATED ORAL at 10:06

## 2020-02-23 RX ADMIN — CARVEDILOL SCH MG: 3.12 TABLET, FILM COATED ORAL at 21:34

## 2020-02-23 NOTE — PN
Progress Note (short form)





- Note


Progress Note: 





Subjective:


no fever or chills. No SOB , no CP . No events over night 





Objective:








Vital Signs:





 Last Vital Signs











Temp Pulse Resp BP Pulse Ox


 


 98.6 F   73   20   123/73   96 


 


 02/23/20 17:00  02/23/20 17:00  02/23/20 17:00  02/23/20 17:00  02/23/20 09:00








 Laboratory Results - last 24 hr











  02/23/20 02/23/20





  05:28 15:10


 


Sodium  133 L 


 


Potassium  4.3 


 


Chloride  96 L 


 


Carbon Dioxide  31 


 


Anion Gap  5 L 


 


BUN  38.5 H 


 


Creatinine  1.6 H 


 


Est GFR (CKD-EPI)AfAm  44.86 


 


Est GFR (CKD-EPI)NonAf  38.71 


 


Random Glucose  86 


 


Calcium  8.3 L 


 


Magnesium   1.9


 


Total Bilirubin  1.0 


 


AST  54 H 


 


ALT  54 


 


Alkaline Phosphatase  86 


 


Total Protein  5.1 L 


 


Albumin  2.7 L 











Physical Exam:


NAD


CV: RRR


Lungs: CTAB 


Ext: 1+ pitting edema on feet and ankles 








A/P 





85 year old man with a history of HTN, CAD with stents, CHF, legal blindness 

who presented to the ED with leg swelling and SOB, he was diagnosed with acute 

systolic CHF. 





1- Acute  systolic CHF. improved 


2- Demand ischemia 


3- Possible CKD 


4- HTN. 


5- CAD, s/p stents. 


6- Transaminitis.





Plan: 





- tele reviewed. NO events 


- Cont po lasix 


- cont  spironolactone 


- cont Lisinopril 


- cont torpol 


- Life vest is pending  


- cont Asa .


- off plavix now . will not resume after dc 


- monitor LFTS. lipitor on hold.


- heparin sq. 





Dc is pending life vest as patient is at high risk for life threatening  

arrhythmias 








Visit type





- Emergency Visit


Emergency Visit: Yes


ED Registration Date: 02/17/20


Care time: The patient presented to the Emergency Department on the above date 

and was hospitalized for further evaluation of their emergent condition.





- New Patient


This patient is new to me today: No





- Critical Care


Critical Care patient: No

## 2020-02-24 LAB
ALBUMIN SERPL-MCNC: 2.6 G/DL (ref 3.4–5)
ALP SERPL-CCNC: 95 U/L (ref 45–117)
ALT SERPL-CCNC: 71 U/L (ref 13–61)
AST SERPL-CCNC: 70 U/L (ref 15–37)
BILIRUB CONJ SERPL-MCNC: 0.3 MG/DL (ref 0–0.2)
BILIRUB SERPL-MCNC: 0.7 MG/DL (ref 0.2–1)
PROT SERPL-MCNC: 5.5 G/DL (ref 6.4–8.2)

## 2020-02-24 RX ADMIN — ASPIRIN 81 MG SCH MG: 81 TABLET ORAL at 10:27

## 2020-02-24 RX ADMIN — LISINOPRIL SCH MG: 5 TABLET ORAL at 10:27

## 2020-02-24 RX ADMIN — FUROSEMIDE SCH MG: 40 TABLET ORAL at 10:27

## 2020-02-24 RX ADMIN — POTASSIUM & SODIUM PHOSPHATES POWDER PACK 280-160-250 MG SCH PACKET: 280-160-250 PACK at 10:27

## 2020-02-24 RX ADMIN — CARVEDILOL SCH MG: 3.12 TABLET, FILM COATED ORAL at 10:27

## 2020-02-24 RX ADMIN — CARVEDILOL SCH MG: 3.12 TABLET, FILM COATED ORAL at 21:40

## 2020-02-24 RX ADMIN — SPIRONOLACTONE SCH MG: 25 TABLET, FILM COATED ORAL at 10:27

## 2020-02-24 NOTE — PN
Progress Note, Physician


History of Present Illness: 





85 yr old man (ifeoma Castillo; raised in [place]), with PMH legally blind, HTN, 

CABG,  CAD s/p 2 stents (all cardiac [facility] [place], 

per pt), systolic (severely reduced LVEF) CHF,  BIBEMS for a week of bland 

taste when eating and concern regarding Covid-19. Denies f/c, cough, cp/sob, n/

v. Good appetite except bland taste when he eats. Examiner noticed edema of the 

legs - pt states he has had for 3 days. Pt was admitted at [facility] and 

discharged w/ Torsemide but has not been taking. 





Denies hx smoking or heavy drinking.


No hx asthma.








- Current Medication List


Current Medications: 


Active Medications





Aspirin (Asa -)  81 mg PO DAILY [facility]


   Last Admin: 02/24/20 10:27 Dose:  81 mg


Carvedilol (Coreg -)  3.125 mg PO BID [facility]


   Last Admin: 02/24/20 10:27 Dose:  3.125 mg


Furosemide (Lasix -)  40 mg PO DAILY [facility]


   Last Admin: 02/24/20 10:27 Dose:  40 mg


Lisinopril (Prinivil)  2.5 mg PO DAILY [facility]


   Last Admin: 02/24/20 10:27 Dose:  2.5 mg


Potassium Phos/Sodium Phos (Phos-Nak Packet -)  1 packet PO DAILY [facility]


   Last Admin: 02/24/20 10:27 Dose:  1 packet


Spironolactone (Aldactone -)  25 mg PO DAILY [facility]


   Last Admin: 02/24/20 10:27 Dose:  25 mg











- Objective


Vital Signs: 


 Vital Signs











Temperature  98.4 F   02/24/20 06:00


 


Pulse Rate  66   02/24/20 06:00


 


Respiratory Rate  20   02/24/20 06:00


 


Blood Pressure  100/66   02/24/20 06:00


 


O2 Sat by Pulse Oximetry (%)  96   02/23/20 21:00











Eyes: Yes: WNL, Conjunctiva Clear, EOM Intact


HENT: Yes: WNL, Atraumatic, Normocephalic


Neck: Yes: WNL, Supple, Trachea Midline


Cardiovascular: Yes: WNL, Regular Rate and Rhythm


Respiratory: Yes: WNL, Regular, CTA Bilaterally


Gastrointestinal: Yes: WNL, Normal Bowel Sounds


Genitourinary: Yes: WNL


Musculoskeletal: Yes: WNL


Extremities: Yes: WNL


Edema: No


Integumentary: Yes: WNL


Neurological: Yes: WNL, Alert, Oriented


...Motor Strength: WNL


Psychiatric: Yes: WNL


Labs: 


 CBC, BMP





 02/21/20 06:02 





 02/23/20 05:28 











Assessment/Plan








1. Acute on chronic LV systolic failure with severely reduced LVEF


2. CAD s/p CABG, s/p PCI/stent, angina


3. HTN


4. CKD


5. Demand ischemia





PLAN:


1. Continue ASA 81 mg QD


2. Continue Carvedilol 3.125 mg BID


3. Continue Lisinopril for now but may consider Entresto therapy starting at 24/

26 mg BID if renal function remains stable


4. Continue Spironolactone 25 mg QD


5. Diuretics (Lasix 40 mg PO QD) and monitor renal function and electrolytes


6. Refuses LifeVest or AICD. UNDERSTANDS RISKS OF DEATH. May be discharged and f

/u as the outpatient with his cardiologists Patient has his own Cardiologists 

at [facility] - does not remember the name, all revascularization was done at [facility]. 

Wife refused to give the name of Cardiologist to the resident since she "did 

not trusts [facility].

## 2020-02-24 NOTE — PN
Teaching Attending Note


Name of Resident: Keo Barbosa





ATTENDING PHYSICIAN STATEMENT





I saw and evaluated the patient.


I reviewed the resident's note and discussed the case with the resident.


I agree with the resident's findings and plan as documented.








SUBJECTIVE:


no fever or chills . No SOB , no N/V no SOB . 


no events over night 


OBJECTIVE:


NAD


CV: RRR


Lungs: CTAB 


Ext: 1+ pitting edema on feet and ankles 








A/P 





85 year old man with a history of HTN, CAD with stents, CHF, legal blindness 

who presented to the ED with leg swelling and SOB, he was diagnosed with acute 

systolic CHF. 





1- Acute  systolic CHF. improved 


2- Demand ischemia 


3- Possible CKD 


4- HTN. 


5- CAD, s/p stents. 


6- Transaminitis.





Plan: 





- tele reviewed. NO events noted today


- Cont po lasix 


- cont  spironolactone 


- cont Lisinopril 


- cont Asa .


- off plavix now . will not resume after dc 


- monitor LFTS. lipitor on hold. can be resumed as out pt , soon hopefully 








Patient refuses life vest. he understands the risk of fatal arrhythmias nad 

cardiac arrest. he believes in no aggressive interventions if it is his time  

to die. His wishes are honored . He is capable of making decisions , and he 

understands his medical condition .


Since were not able to confirm his meds, he was asked to throw out all his old 

meds and take only what's on the discharge list

## 2020-02-24 NOTE — DS
Physical Exam: 


SUBJECTIVE: Patient seen and examined at the bedside. Stated he is doing well. 

Denies acute complaints of cp, sob, abd pain, n/v/c/d, headaches, dizziness, 

lightheadedness, fever, chills, focal weakness, numbness, tingling. 








OBJECTIVE:





 Vital Signs











 Period  Temp  Pulse  Resp  BP Sys/Tyler  Pulse Ox


 


 Last 24 Hr  97.8 F-99.1 F  66-74  20-20  /50-73  96-99








PHYSICAL EXAM





GENERAL: The patient is awake, alert, and fully oriented, in no acute distress.


EYES: Very poorly reactive pupils. White-out of lenses.


ENT: Oropharynx clear without exudates, moist mucous membranes.


LUNGS: Breath sounds equal, no crackles heard. No wheezes auscultated. 


HEART: Regular rate and rhythm, S1, S2 with noted systolic ejection murmur.


ABDOMEN: Soft, nontender, nondistended, normoactive bowel sounds, no guarding, 

no rebound, no masses. Cook's negative. 


EXTREMITIES: 1+ pulses, warm, well-perfused, 1+ pitting edema noted on just 

above the ankles.


PSYCH: Normal mood, normal affect.


SKIN: Warm, dry, normal turgor.





LABS


 Laboratory Results - last 24 hr











  02/24/20





  05:45


 


Total Bilirubin  0.7


 


Direct Bilirubin  0.3 H


 


AST  70 H


 


ALT  71 H


 


Alkaline Phosphatase  95


 


Total Protein  5.5 L


 


Albumin  2.6 L











HOSPITAL COURSE:





Ashu Benson is a 85 year old male with a past medical history of PMH HTN, CAD s

/p 2x stent placements, CHF, legal blindness admitted for acute CHF 

decompensation. Patient was treated with IV Lasix and improved. Echo at this 

facility as below. Previous records from [facility] noting Cath Report:  LAD ostial 

100% stenosis, circumflex patent stent in proximal and mid segments, RCA 

proximal 80% stenosis with CARLIN flow 3.


Placed [employer] Drug Eluting Stent. Recommendations of asa 81mg indefinitely, 

clopidogrel 75mg for 6 months or longer as indicated. Statin indefinitely.


Patient's wife was contacted and she refused to give information regarding the 

patient's primary care physician, medications, or pharmacy. At this facility 

medications were started atorvastatin 10mg daily, lisinopril 2.5mg daily, 

spironolactone 25mg daily, coreg 3.125mg bid. Was offered the options of 

LifeVest and ICD and the patient refused these options stating he does not want 

an invasive procedure or to wear the LifeVest. Was spoken to about the risks of 

not wearing the LifeVest including but not limited to sudden arrhythmias, 

syncope, heart failure, and/or death. Was advised not to take any of his old 

medications and advised to only take the medications prescribed at this 

facility and to follow up with his PCP and cardiologist. 


Patient was advised to follow up with nephrology due to bilateral cortical 

cysts. 


Had elevated bilirubin and had RUQ U/S noting cholelithiasis, gallbladder wall 

thickening and pericholecystic fluid. F/u HIDA noting no cholecystitis or 

biliary obstruction. Noted moderate gallbladder distension at 2 hours 

suggesting gallbladder dyskinesis. Was advised to follow up with general 

surgery for these findings. 


Patient was spoken to about these findings. Was advised to follow up with PCP, 

cardiology, general surgery, nephrology and to start the medications listed 

above in addition to aspirin. Advised to stop Plavix as stents were greater 

than 1 year prior as per received records. 


Patient was in agreement with the plan and was discharged in stable medical 

condition. 








ECHO:


The left ventricle is severely dilated.


There is severe global hypokinesis of the left ventricle.


Left ventricular systolic function is severely reduced.


Ejection Fraction = 25%.


The right ventricle is mildly dilated.


The right ventricular systolic function is mildly reduced.


The left atrium is mildly dilated.


The right atrium is mildly dilated.


There is moderate mitral annular calcification.


There is moderate mitral valve thickening.


There is mild to moderate mitral regurgitation.


There is mild tricuspid regurgitation.


Right ventricular systolic pressure is elevated at 42 mmhg.


Assuming the RA pressure is 10 mmHg


There is moderate to severe aortic valve thickening.





Date of Admission:02/17/20





Date of Discharge: 02/24/20











Minutes to complete discharge: 35





Discharge Summary


Problems reviewed: Yes


Reason For Visit: ACUTE KIDNEY INJURY, CONGESTIVE HEART FAILURE


Current Active Problems





H/O heart artery stent (Chronic)


HTN (hypertension) (Chronic)


Hx of CABG (Chronic)


Renal insufficiency (Chronic)








Condition: Stable





- Instructions


Diet, Activity, Other Instructions: 


You were admitted for shortness of breath which was due to your heart failure. 

You were treated for your heart failure and started on medications to optimize 

your heart. You had an echocardiogram which showed poor function of your heart. 

You were seen by the cardiologist who recommended you have a LifeVest to 

prevent any arrhythmia events from your heart which was discussed with you and 

you refused the LifeVest. You were also encouraged to have a pacemaker placed 

which you had refused as well. You are encouraged to follow up with the 

cardiologist in the outpatient clinic. 


You had elevated levels of your liver enzymes for which you had an ultrasound 

which showed that you have gallbladder stones, thickening of your gallbladder, 

fluid around the gallbladder. You had another scan which did not show any 

obstruction in your gallbadder. You are encouraged to follow up with a 

gastroenterologist for these findings. 





MEDICATIONS





DO NOT TAKE ANY OF YOUR OLD MEDICATIONS. ONLY TAKE THE MEDICATIONS PRESCRIBED 

TO YOU AT THIS HOSPITAL AND FOLLOW UP WITH YOUR PRIMARY CARE DOCTOR AND 

CARDIOLOGIST.





START to take aspirin 81mg daily.


START to take lisinopril 2.5mg daily.


START to take furosemide 40mg daily.


START to take carvedilol 3.125mg twice a day.


START to take spironolactone 25mg daily.





Make sure to follow up with your doctors to optimize your medications.





weigh yourself daily and take log to your cardiologist to adjust your 

medications 





REFERRALS





Please follow up with your primary care doctor, within 1 week. If you do not 

have one you may follow up with the resident's clinic for which the information 

is provided.


Please follow up with your cardiologist, within 1 week. If you do not have one 

or wish to switch cardiologists, you may follow up with Dr. Sunny Chopra.


Please follow up with your nephrologist, within 1 week. If you do not have one 

or wish to switch nephrologists, you may follow up with Dr. Robel Mendoza.


Please follow up with Dr. Crowder, within 1 week to evaluate your gall bladder 

stones.





SPECIAL INSTRUCTIONS





Make sure to only take the medications that were prescribed to you at this 

hospital , and follow up with your doctors to optimize your medication regimen.


Follow up with repeat bloodwork at your primary care office for your liver 

enzymes in 1 week.





If you have any further symptoms of shortness of breath, dizziness, 

lightheadedness, chest pain, fevers, falls, or any other general feelings of 

unwellness, please call 911 or go to your nearest emergency room. 





Referrals: 


[facility] [place] [Provider Group] - 1 Week


Hilario Crowder MD [Staff Physician] - 1 Week


Sunny Chorpa MD [Staff Physician] - 1 Week


Robel Mendoza MD [Staff Physician] - 1 Week


Disposition: HOME





- Home Medications


Comprehensive Discharge Medication List: 


Ambulatory Orders





Aspirin [ASA -] 81 mg PO DAILY #30 tab.chew 02/24/20 


Carvedilol [Coreg -] 3.125 mg PO BID #60 tablet 02/24/20 


Furosemide [Lasix -] 40 mg PO DAILY #30 tablet 02/24/20 


Lisinopril [Zestril] 2.5 mg PO DAILY #30 tablet 02/24/20 


Spironolactone [Aldactone -] 25 mg PO DAILY #30 tablet 02/24/20 











Problem List





- Problems


(1) H/O heart artery stent


Code(s): Z95.5 - PRESENCE OF CORONARY ANGIOPLASTY IMPLANT AND GRAFT   





(2) HTN (hypertension)


Code(s): I10 - ESSENTIAL (PRIMARY) HYPERTENSION   


Qualifiers: 


   Hypertension type: essential hypertension   Qualified Code(s): I10 - 

Essential (primary) hypertension   





(3) Hx of CABG


Code(s): Z95.1 - PRESENCE OF AORTOCORONARY BYPASS GRAFT   





(4) Renal insufficiency


Code(s): N28.9 - DISORDER OF KIDNEY AND URETER, UNSPECIFIED   





(5) Acute on chronic systolic CHF (congestive heart failure)


Code(s): I50.23 - ACUTE ON CHRONIC SYSTOLIC (CONGESTIVE) HEART FAILURE   





(6) Elevated troponin I level


Code(s): R79.89 - OTHER SPECIFIED ABNORMAL FINDINGS OF BLOOD CHEMISTRY   


This patient is new to me today: No


Emergency Visit: Yes


ED Registration Date: 02/17/20


Care time: The patient presented to the Emergency Department on the above date 

and was hospitalized for further evaluation of their emergent condition.


Critical Care patient: No





- Discharge Referral


Referred to [facility] No

## 2020-02-25 VITALS — TEMPERATURE: 98 F | DIASTOLIC BLOOD PRESSURE: 62 MMHG | SYSTOLIC BLOOD PRESSURE: 118 MMHG | HEART RATE: 70 BPM

## 2020-02-25 RX ADMIN — LISINOPRIL SCH MG: 5 TABLET ORAL at 09:16

## 2020-02-25 RX ADMIN — FUROSEMIDE SCH MG: 40 TABLET ORAL at 09:16

## 2020-02-25 RX ADMIN — ASPIRIN 81 MG SCH MG: 81 TABLET ORAL at 09:16

## 2020-02-25 RX ADMIN — POTASSIUM & SODIUM PHOSPHATES POWDER PACK 280-160-250 MG SCH PACKET: 280-160-250 PACK at 09:16

## 2020-02-25 RX ADMIN — CARVEDILOL SCH MG: 3.12 TABLET, FILM COATED ORAL at 09:16

## 2020-02-25 RX ADMIN — SPIRONOLACTONE SCH MG: 25 TABLET, FILM COATED ORAL at 09:16

## 2020-02-25 NOTE — PN
Progress Note, Physician


Chief Complaint: 


Pt A&Ox3; sitting up at bedside; asymptomatic.


P's legs are feeling "stonger",; he looks forward to going home and starting to 

walk 1/2 hours daily or more.


History of Present Illness: 





85 yr old man (ifeoma Castillo; raised in [place]), with PMH legally blind, HTN, 

CABG,  CAD s/p 2 stents (all cardiac work done at [facility], [address], 

per pt), systolic (severely reduced LVEF) CHF,  BIBEMS for a week of bland 

taste when eating and concern regarding Covid-19. Denies f/c, cough, cp/sob, n/

v. Good appetite except bland taste when he eats. Examiner noticed edema of the 

legs - pt states he has had for 3 days. Pt was admitted at [facility] and 

discharged w/ Torsemide but has not been taking. 





Denies hx smoking or heavy drinking.


No hx asthma.





- Current Medication List


Current Medications: 


Active Medications





Aspirin (Asa -)  81 mg PO DAILY [facility]


   Last Admin: 02/25/20 09:16 Dose:  81 mg


Carvedilol (Coreg -)  3.125 mg PO BID [facility]


   Last Admin: 02/25/20 09:16 Dose:  3.125 mg


Furosemide (Lasix -)  40 mg PO DAILY [facility]


   Last Admin: 02/25/20 09:16 Dose:  40 mg


Lisinopril (Prinivil)  2.5 mg PO DAILY [facility]


   Last Admin: 02/25/20 09:16 Dose:  2.5 mg


Potassium Phos/Sodium Phos (Phos-Nak Packet -)  1 packet PO DAILY [facility]


   Last Admin: 02/25/20 09:16 Dose:  1 packet


Spironolactone (Aldactone -)  25 mg PO DAILY [facility]


   Last Admin: 02/25/20 09:16 Dose:  25 mg











- Objective


Vital Signs: 


 Vital Signs











Temperature  98 F   02/25/20 09:00


 


Pulse Rate  70   02/25/20 09:00


 


Respiratory Rate  18   02/25/20 09:00


 


Blood Pressure  118/62   02/25/20 09:00


 


O2 Sat by Pulse Oximetry (%)  97   02/24/20 21:00











Constitutional: Yes: No Distress, Calm


Eyes: Yes: Other (blind)


HENT: Yes: WNL


Neck: Yes: WNL


Cardiovascular: Yes: S1, S2


Respiratory: Yes: Regular


Gastrointestinal: Yes: Soft


...Rectal Exam: Yes: Deferred


Genitourinary: No: Anuria


Musculoskeletal: Yes: Muscle Weakness


Extremities: Yes: WNL


Edema: No


Peripheral Pulses WNL: Yes


Integumentary: Yes: WNL


Neurological: Yes: Alert, Oriented


Psychiatric: Yes: WNL


Labs: 


 CBC, BMP





 02/21/20 06:02 





 02/23/20 05:28 











Problem List





- Problems


(1) Acute on chronic systolic CHF (congestive heart failure)


Assessment/Plan: 





s/p 2 coronary stents 2018 at [facility].


On ASA; clopoidogrel discontinued.


On metoprolol, lisinopril, and spironolactone; furosemide prn.


F/u BUN/Cr, electrolytes, daily weight, Is and Os.





As noted, pt does not want either the Life Vest or consideration for permanent 

ICD.He will f/u with his cardiologist at ?[facility](whose name he still 

doest not remember).


Code(s): I50.23 - ACUTE ON CHRONIC SYSTOLIC (CONGESTIVE) HEART FAILURE   





(2) HTN (hypertension)


Code(s): I10 - ESSENTIAL (PRIMARY) HYPERTENSION   


Qualifiers: 


   Hypertension type: essential hypertension   Qualified Code(s): I10 - 

Essential (primary) hypertension   





(3) Renal insufficiency


Assessment/Plan: 


CXR: improving; no signs of acute failure.


Rod decrease furosemide dose.


Code(s): N28.9 - DISORDER OF KIDNEY AND URETER, UNSPECIFIED   





(4) Elevated troponin I level


Code(s): R79.89 - OTHER SPECIFIED ABNORMAL FINDINGS OF BLOOD CHEMISTRY   





(5) H/O heart artery stent


Code(s): Z95.5 - PRESENCE OF CORONARY ANGIOPLASTY IMPLANT AND GRAFT

## 2020-02-25 NOTE — PN
Physical Exam: 


SUBJECTIVE: Patient seen and examined at the bedside. Did not leave yesterday 

due to inability to reach wife to greet the patient upon discharge. 


Wife reached today and stated that he will be greeted and the wife will assist 

with care upon arrival. 


Patient stated that he is feeling well and had no acute complaints. 





OBJECTIVE:





 Vital Signs











 Period  Temp  Pulse  Resp  BP Sys/Tyler  Pulse Ox


 


 Last 24 Hr  98 F-99.7 F  68-73  18-20  /44-64  97-97











GENERAL: The patient is awake, alert, and fully oriented, in no acute distress.


EYES: Very poorly reactive pupils. White-out of lenses.


ENT: Oropharynx clear without exudates, moist mucous membranes.


LUNGS: Breath sounds equal, no crackles heard. No wheezes auscultated. 


HEART: Regular rate and rhythm, S1, S2 with noted systolic ejection murmur.


ABDOMEN: Soft, nontender, nondistended, normoactive bowel sounds, no guarding, 

no rebound, no masses. Cook's negative. 


EXTREMITIES: 1+ pulses, warm, well-perfused, 1+ pitting edema noted on just 

above the ankles.


PSYCH: Normal mood, normal affect.


SKIN: Warm, dry, normal turgor.














ASSESSMENT/PLAN:


No change from discharge plan apart from sending patient's medications to his 

pharmacy [facility].


Refer to discharge summary for further information.











Problem List





- Problems


(1) H/O heart artery stent


Code(s): Z95.5 - PRESENCE OF CORONARY ANGIOPLASTY IMPLANT AND GRAFT   





(2) HTN (hypertension)


Code(s): I10 - ESSENTIAL (PRIMARY) HYPERTENSION   


Qualifiers: 


   Hypertension type: essential hypertension   Qualified Code(s): I10 - 

Essential (primary) hypertension   





(3) Hx of CABG


Code(s): Z95.1 - PRESENCE OF AORTOCORONARY BYPASS GRAFT   





(4) Renal insufficiency


Code(s): N28.9 - DISORDER OF KIDNEY AND URETER, UNSPECIFIED   





(5) Acute on chronic systolic CHF (congestive heart failure)


Code(s): I50.23 - ACUTE ON CHRONIC SYSTOLIC (CONGESTIVE) HEART FAILURE   





(6) Elevated troponin I level


Code(s): R79.89 - OTHER SPECIFIED ABNORMAL FINDINGS OF BLOOD CHEMISTRY   





Visit type





- Emergency Visit


Emergency Visit: Yes


ED Registration Date: 02/17/20


Care time: The patient presented to the Emergency Department on the above date 

and was hospitalized for further evaluation of their emergent condition.





- New Patient


This patient is new to me today: No





- Critical Care


Critical Care patient: No

## 2020-02-25 NOTE — HOSP
Subjective





- Review of Symptoms


Events since last encounter: 





patient was discharged yesterday but did not leave due to not being able to 

contact wife. this am , sw spoke toher and she will be receiving patient . VNS 

was arranged. he has no complaints today and his lungs are clear on exam. He 

has  minimal edema on ankles and feet . No erythema . 





dc plan did not change.





Physical Examination


Vital Signs: 


 


Labs: 


 CBC, BMP





 02/21/20 06:02 





 02/23/20 05:28